# Patient Record
Sex: MALE | Race: WHITE | Employment: OTHER | URBAN - METROPOLITAN AREA
[De-identification: names, ages, dates, MRNs, and addresses within clinical notes are randomized per-mention and may not be internally consistent; named-entity substitution may affect disease eponyms.]

---

## 2017-07-06 PROBLEM — E11.9 DIABETES MELLITUS (HCC): Status: ACTIVE | Noted: 2017-07-06

## 2017-07-06 PROBLEM — R06.89 DYSPNEA AND RESPIRATORY ABNORMALITIES: Status: ACTIVE | Noted: 2017-07-06

## 2017-07-06 PROBLEM — M19.022 ARTHRITIS OF ELBOW, LEFT: Status: ACTIVE | Noted: 2017-07-06

## 2017-07-06 PROBLEM — E87.20 LACTIC ACIDOSIS: Status: ACTIVE | Noted: 2017-07-06

## 2017-07-06 PROBLEM — R06.00 DYSPNEA AND RESPIRATORY ABNORMALITIES: Status: ACTIVE | Noted: 2017-07-06

## 2017-07-07 PROBLEM — R06.02 SOB (SHORTNESS OF BREATH): Status: ACTIVE | Noted: 2017-07-06

## 2019-03-14 ENCOUNTER — APPOINTMENT (OUTPATIENT)
Dept: GENERAL RADIOLOGY | Age: 84
DRG: 301 | End: 2019-03-14
Payer: MEDICARE

## 2019-03-14 ENCOUNTER — APPOINTMENT (OUTPATIENT)
Dept: ULTRASOUND IMAGING | Age: 84
DRG: 301 | End: 2019-03-14
Payer: MEDICARE

## 2019-03-14 ENCOUNTER — APPOINTMENT (OUTPATIENT)
Dept: CT IMAGING | Age: 84
DRG: 301 | End: 2019-03-14
Payer: MEDICARE

## 2019-03-14 LAB
ALBUMIN SERPL-MCNC: 3.6 G/DL (ref 3.5–5.2)
ALP BLD-CCNC: 87 U/L (ref 40–129)
ALT SERPL-CCNC: 15 U/L (ref 0–40)
ANION GAP SERPL CALCULATED.3IONS-SCNC: 12 MMOL/L (ref 7–16)
APTT: 25.4 SEC (ref 24.5–35.1)
AST SERPL-CCNC: 18 U/L (ref 0–39)
BASOPHILS ABSOLUTE: 0.05 E9/L (ref 0–0.2)
BASOPHILS RELATIVE PERCENT: 0.5 % (ref 0–2)
BILIRUB SERPL-MCNC: 0.6 MG/DL (ref 0–1.2)
BUN BLDV-MCNC: 21 MG/DL (ref 8–23)
CALCIUM SERPL-MCNC: 9.4 MG/DL (ref 8.6–10.2)
CHLORIDE BLD-SCNC: 97 MMOL/L (ref 98–107)
CO2: 24 MMOL/L (ref 22–29)
CREAT SERPL-MCNC: 1.2 MG/DL (ref 0.7–1.2)
EOSINOPHILS ABSOLUTE: 0.12 E9/L (ref 0.05–0.5)
EOSINOPHILS RELATIVE PERCENT: 1.2 % (ref 0–6)
GFR AFRICAN AMERICAN: >60
GFR NON-AFRICAN AMERICAN: 57 ML/MIN/1.73
GLUCOSE BLD-MCNC: 319 MG/DL (ref 74–99)
HCT VFR BLD CALC: 39.9 % (ref 37–54)
HEMOGLOBIN: 13.2 G/DL (ref 12.5–16.5)
IMMATURE GRANULOCYTES #: 0.03 E9/L
IMMATURE GRANULOCYTES %: 0.3 % (ref 0–5)
INR BLD: 1.4
LACTIC ACID: 1.7 MMOL/L (ref 0.5–2.2)
LYMPHOCYTES ABSOLUTE: 1.29 E9/L (ref 1.5–4)
LYMPHOCYTES RELATIVE PERCENT: 12.9 % (ref 20–42)
MCH RBC QN AUTO: 29.9 PG (ref 26–35)
MCHC RBC AUTO-ENTMCNC: 33.1 % (ref 32–34.5)
MCV RBC AUTO: 90.5 FL (ref 80–99.9)
MONOCYTES ABSOLUTE: 0.92 E9/L (ref 0.1–0.95)
MONOCYTES RELATIVE PERCENT: 9.2 % (ref 2–12)
NEUTROPHILS ABSOLUTE: 7.6 E9/L (ref 1.8–7.3)
NEUTROPHILS RELATIVE PERCENT: 75.9 % (ref 43–80)
PDW BLD-RTO: 13.8 FL (ref 11.5–15)
PLATELET # BLD: 179 E9/L (ref 130–450)
PMV BLD AUTO: 10 FL (ref 7–12)
POTASSIUM SERPL-SCNC: 4.6 MMOL/L (ref 3.5–5)
PROTHROMBIN TIME: 16 SEC (ref 9.3–12.4)
RBC # BLD: 4.41 E12/L (ref 3.8–5.8)
SODIUM BLD-SCNC: 133 MMOL/L (ref 132–146)
TOTAL PROTEIN: 6.8 G/DL (ref 6.4–8.3)
TROPONIN: <0.01 NG/ML (ref 0–0.03)
WBC # BLD: 10 E9/L (ref 4.5–11.5)

## 2019-03-14 PROCEDURE — 70450 CT HEAD/BRAIN W/O DYE: CPT

## 2019-03-14 PROCEDURE — 72125 CT NECK SPINE W/O DYE: CPT

## 2019-03-14 PROCEDURE — 73562 X-RAY EXAM OF KNEE 3: CPT

## 2019-03-14 PROCEDURE — 85610 PROTHROMBIN TIME: CPT

## 2019-03-14 PROCEDURE — 93971 EXTREMITY STUDY: CPT

## 2019-03-14 PROCEDURE — 73590 X-RAY EXAM OF LOWER LEG: CPT

## 2019-03-14 PROCEDURE — 83605 ASSAY OF LACTIC ACID: CPT

## 2019-03-14 PROCEDURE — 80053 COMPREHEN METABOLIC PANEL: CPT

## 2019-03-14 PROCEDURE — 85025 COMPLETE CBC W/AUTO DIFF WBC: CPT

## 2019-03-14 PROCEDURE — 36415 COLL VENOUS BLD VENIPUNCTURE: CPT

## 2019-03-14 PROCEDURE — 87040 BLOOD CULTURE FOR BACTERIA: CPT

## 2019-03-14 PROCEDURE — 84484 ASSAY OF TROPONIN QUANT: CPT

## 2019-03-14 PROCEDURE — 85730 THROMBOPLASTIN TIME PARTIAL: CPT

## 2019-03-14 PROCEDURE — 73502 X-RAY EXAM HIP UNI 2-3 VIEWS: CPT

## 2019-03-14 ASSESSMENT — PAIN DESCRIPTION - PAIN TYPE: TYPE: ACUTE PAIN

## 2019-03-14 ASSESSMENT — PAIN SCALES - GENERAL: PAINLEVEL_OUTOF10: 6

## 2019-03-14 ASSESSMENT — PAIN DESCRIPTION - DESCRIPTORS: DESCRIPTORS: DISCOMFORT

## 2019-03-14 ASSESSMENT — PAIN DESCRIPTION - LOCATION: LOCATION: LEG;SHOULDER

## 2019-03-14 ASSESSMENT — PAIN DESCRIPTION - ORIENTATION: ORIENTATION: RIGHT

## 2019-03-15 ENCOUNTER — HOSPITAL ENCOUNTER (INPATIENT)
Age: 84
LOS: 3 days | Discharge: NURSING, REHAB OR LONG-TERM CARE | DRG: 301 | End: 2019-03-19
Attending: EMERGENCY MEDICINE | Admitting: INTERNAL MEDICINE
Payer: MEDICARE

## 2019-03-15 DIAGNOSIS — I82.401 ACUTE DEEP VEIN THROMBOSIS (DVT) OF RIGHT LOWER EXTREMITY, UNSPECIFIED VEIN (HCC): Primary | ICD-10-CM

## 2019-03-15 DIAGNOSIS — S49.91XA INJURY OF RIGHT SHOULDER, INITIAL ENCOUNTER: ICD-10-CM

## 2019-03-15 DIAGNOSIS — R26.2 UNABLE TO AMBULATE: ICD-10-CM

## 2019-03-15 PROBLEM — M19.022 ARTHRITIS OF ELBOW, LEFT: Status: RESOLVED | Noted: 2017-07-06 | Resolved: 2019-03-15
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
## 2019-03-15 PROBLEM — Z86.73 HISTORY OF COMPLETED STROKE: Chronic | Status: ACTIVE | Noted: 2019-03-15

## 2019-03-15 PROBLEM — Z86.73 HISTORY OF CVA (CEREBROVASCULAR ACCIDENT): Chronic | Status: ACTIVE | Noted: 2019-03-15

## 2019-03-15 PROBLEM — Z86.73 HISTORY OF COMPLETED STROKE: Chronic | Status: RESOLVED | Noted: 2019-03-15 | Resolved: 2019-03-15

## 2019-03-15 PROBLEM — I82.4Z1 LOWER LEG DVT (DEEP VENOUS THROMBOEMBOLISM), ACUTE, RIGHT (HCC): Status: ACTIVE | Noted: 2019-03-15

## 2019-03-15 LAB
BACTERIA: ABNORMAL /HPF
BILIRUBIN URINE: NEGATIVE
BLOOD, URINE: ABNORMAL
CHP ED QC CHECK: YES
CLARITY: CLEAR
COLOR: YELLOW
GLUCOSE BLD-MCNC: 225 MG/DL
GLUCOSE URINE: 500 MG/DL
KETONES, URINE: NEGATIVE MG/DL
LEUKOCYTE ESTERASE, URINE: ABNORMAL
METER GLUCOSE: 146 MG/DL (ref 74–99)
METER GLUCOSE: 175 MG/DL (ref 74–99)
METER GLUCOSE: 177 MG/DL (ref 74–99)
METER GLUCOSE: 214 MG/DL (ref 74–99)
METER GLUCOSE: 225 MG/DL (ref 74–99)
NITRITE, URINE: NEGATIVE
PH UA: 6.5 (ref 5–9)
PROTEIN UA: NEGATIVE MG/DL
RBC UA: ABNORMAL /HPF (ref 0–2)
SPECIFIC GRAVITY UA: <=1.005 (ref 1–1.03)
UROBILINOGEN, URINE: 1 E.U./DL
WBC UA: ABNORMAL /HPF (ref 0–5)

## 2019-03-15 PROCEDURE — 81001 URINALYSIS AUTO W/SCOPE: CPT

## 2019-03-15 PROCEDURE — G0378 HOSPITAL OBSERVATION PER HR: HCPCS

## 2019-03-15 PROCEDURE — 97165 OT EVAL LOW COMPLEX 30 MIN: CPT

## 2019-03-15 PROCEDURE — 6370000000 HC RX 637 (ALT 250 FOR IP): Performed by: INTERNAL MEDICINE

## 2019-03-15 PROCEDURE — 99285 EMERGENCY DEPT VISIT HI MDM: CPT

## 2019-03-15 PROCEDURE — 2580000003 HC RX 258: Performed by: INTERNAL MEDICINE

## 2019-03-15 PROCEDURE — 97530 THERAPEUTIC ACTIVITIES: CPT

## 2019-03-15 PROCEDURE — 82962 GLUCOSE BLOOD TEST: CPT

## 2019-03-15 PROCEDURE — 97535 SELF CARE MNGMENT TRAINING: CPT

## 2019-03-15 PROCEDURE — 97530 THERAPEUTIC ACTIVITIES: CPT | Performed by: PHYSICAL THERAPIST

## 2019-03-15 PROCEDURE — 6360000002 HC RX W HCPCS: Performed by: PHYSICIAN ASSISTANT

## 2019-03-15 PROCEDURE — 96372 THER/PROPH/DIAG INJ SC/IM: CPT

## 2019-03-15 PROCEDURE — 97161 PT EVAL LOW COMPLEX 20 MIN: CPT | Performed by: PHYSICAL THERAPIST

## 2019-03-15 RX ORDER — ASPIRIN 81 MG/1
81 TABLET, CHEWABLE ORAL EVERY MORNING
Status: DISCONTINUED | OUTPATIENT
Start: 2019-03-15 | End: 2019-03-19 | Stop reason: HOSPADM

## 2019-03-15 RX ORDER — METOPROLOL SUCCINATE 50 MG/1
50 TABLET, EXTENDED RELEASE ORAL EVERY MORNING
Status: DISCONTINUED | OUTPATIENT
Start: 2019-03-15 | End: 2019-03-19 | Stop reason: HOSPADM

## 2019-03-15 RX ORDER — GLIMEPIRIDE 4 MG/1
8 TABLET ORAL
Status: DISCONTINUED | OUTPATIENT
Start: 2019-03-15 | End: 2019-03-19 | Stop reason: HOSPADM

## 2019-03-15 RX ORDER — INSULIN GLARGINE 100 [IU]/ML
12 INJECTION, SOLUTION SUBCUTANEOUS EVERY EVENING
Status: DISCONTINUED | OUTPATIENT
Start: 2019-03-15 | End: 2019-03-19 | Stop reason: HOSPADM

## 2019-03-15 RX ORDER — OXYBUTYNIN CHLORIDE 5 MG/1
5 TABLET ORAL EVERY MORNING
Status: DISCONTINUED | OUTPATIENT
Start: 2019-03-15 | End: 2019-03-19 | Stop reason: HOSPADM

## 2019-03-15 RX ORDER — ACETAMINOPHEN 325 MG/1
650 TABLET ORAL EVERY 4 HOURS PRN
Status: DISCONTINUED | OUTPATIENT
Start: 2019-03-15 | End: 2019-03-19 | Stop reason: HOSPADM

## 2019-03-15 RX ORDER — DEXTROSE MONOHYDRATE 50 MG/ML
100 INJECTION, SOLUTION INTRAVENOUS PRN
Status: DISCONTINUED | OUTPATIENT
Start: 2019-03-15 | End: 2019-03-19 | Stop reason: HOSPADM

## 2019-03-15 RX ORDER — CLOPIDOGREL BISULFATE 75 MG/1
75 TABLET ORAL DAILY
Status: DISCONTINUED | OUTPATIENT
Start: 2019-03-15 | End: 2019-03-15

## 2019-03-15 RX ORDER — ATORVASTATIN CALCIUM 10 MG/1
10 TABLET, FILM COATED ORAL NIGHTLY
Status: DISCONTINUED | OUTPATIENT
Start: 2019-03-15 | End: 2019-03-19 | Stop reason: HOSPADM

## 2019-03-15 RX ORDER — DEXTROSE MONOHYDRATE 25 G/50ML
12.5 INJECTION, SOLUTION INTRAVENOUS PRN
Status: DISCONTINUED | OUTPATIENT
Start: 2019-03-15 | End: 2019-03-19 | Stop reason: HOSPADM

## 2019-03-15 RX ORDER — SODIUM CHLORIDE 0.9 % (FLUSH) 0.9 %
10 SYRINGE (ML) INJECTION PRN
Status: DISCONTINUED | OUTPATIENT
Start: 2019-03-15 | End: 2019-03-19 | Stop reason: HOSPADM

## 2019-03-15 RX ORDER — NICOTINE POLACRILEX 4 MG
15 LOZENGE BUCCAL PRN
Status: DISCONTINUED | OUTPATIENT
Start: 2019-03-15 | End: 2019-03-19 | Stop reason: HOSPADM

## 2019-03-15 RX ORDER — SODIUM CHLORIDE 0.9 % (FLUSH) 0.9 %
10 SYRINGE (ML) INJECTION EVERY 12 HOURS SCHEDULED
Status: DISCONTINUED | OUTPATIENT
Start: 2019-03-15 | End: 2019-03-19 | Stop reason: HOSPADM

## 2019-03-15 RX ADMIN — INSULIN GLARGINE 12 UNITS: 100 INJECTION, SOLUTION SUBCUTANEOUS at 18:00

## 2019-03-15 RX ADMIN — ATORVASTATIN CALCIUM 10 MG: 10 TABLET, FILM COATED ORAL at 20:45

## 2019-03-15 RX ADMIN — INSULIN LISPRO 4 UNITS: 100 INJECTION, SOLUTION INTRAVENOUS; SUBCUTANEOUS at 09:07

## 2019-03-15 RX ADMIN — ENOXAPARIN SODIUM 90 MG: 100 INJECTION SUBCUTANEOUS at 02:29

## 2019-03-15 RX ADMIN — APIXABAN 10 MG: 5 TABLET, FILM COATED ORAL at 20:45

## 2019-03-15 RX ADMIN — ACETAMINOPHEN 650 MG: 325 TABLET, FILM COATED ORAL at 10:05

## 2019-03-15 RX ADMIN — ASPIRIN 81 MG 81 MG: 81 TABLET ORAL at 09:58

## 2019-03-15 RX ADMIN — INSULIN LISPRO 2 UNITS: 100 INJECTION, SOLUTION INTRAVENOUS; SUBCUTANEOUS at 13:15

## 2019-03-15 RX ADMIN — Medication 10 ML: at 20:45

## 2019-03-15 RX ADMIN — APIXABAN 10 MG: 5 TABLET, FILM COATED ORAL at 16:56

## 2019-03-15 RX ADMIN — METOPROLOL SUCCINATE 50 MG: 50 TABLET, EXTENDED RELEASE ORAL at 09:59

## 2019-03-15 RX ADMIN — GLIMEPIRIDE 8 MG: 4 TABLET ORAL at 09:58

## 2019-03-15 RX ADMIN — Medication 10 ML: at 10:00

## 2019-03-15 RX ADMIN — LINAGLIPTIN 5 MG: 5 TABLET, FILM COATED ORAL at 09:59

## 2019-03-15 RX ADMIN — INSULIN LISPRO 1 UNITS: 100 INJECTION, SOLUTION INTRAVENOUS; SUBCUTANEOUS at 20:45

## 2019-03-15 RX ADMIN — OXYBUTYNIN CHLORIDE 5 MG: 5 TABLET ORAL at 10:00

## 2019-03-15 RX ADMIN — INSULIN LISPRO 2 UNITS: 100 INJECTION, SOLUTION INTRAVENOUS; SUBCUTANEOUS at 16:53

## 2019-03-15 ASSESSMENT — PAIN SCALES - GENERAL
PAINLEVEL_OUTOF10: 0
PAINLEVEL_OUTOF10: 4
PAINLEVEL_OUTOF10: 0

## 2019-03-15 ASSESSMENT — PAIN DESCRIPTION - DESCRIPTORS: DESCRIPTORS: ACHING;DULL;DISCOMFORT

## 2019-03-15 ASSESSMENT — PAIN DESCRIPTION - PROGRESSION: CLINICAL_PROGRESSION: NOT CHANGED

## 2019-03-15 ASSESSMENT — PAIN DESCRIPTION - PAIN TYPE: TYPE: CHRONIC PAIN

## 2019-03-15 ASSESSMENT — PAIN DESCRIPTION - LOCATION: LOCATION: SHOULDER

## 2019-03-15 ASSESSMENT — PAIN - FUNCTIONAL ASSESSMENT: PAIN_FUNCTIONAL_ASSESSMENT: ACTIVITIES ARE NOT PREVENTED

## 2019-03-16 PROBLEM — R13.10 DYSPHAGIA: Status: ACTIVE | Noted: 2019-03-16

## 2019-03-16 LAB
ANION GAP SERPL CALCULATED.3IONS-SCNC: 13 MMOL/L (ref 7–16)
BUN BLDV-MCNC: 24 MG/DL (ref 8–23)
CALCIUM SERPL-MCNC: 9 MG/DL (ref 8.6–10.2)
CHLORIDE BLD-SCNC: 104 MMOL/L (ref 98–107)
CO2: 21 MMOL/L (ref 22–29)
CREAT SERPL-MCNC: 1 MG/DL (ref 0.7–1.2)
GFR AFRICAN AMERICAN: >60
GFR NON-AFRICAN AMERICAN: >60 ML/MIN/1.73
GLUCOSE BLD-MCNC: 143 MG/DL (ref 74–99)
HCT VFR BLD CALC: 38.1 % (ref 37–54)
HEMOGLOBIN: 12.6 G/DL (ref 12.5–16.5)
MCH RBC QN AUTO: 29.5 PG (ref 26–35)
MCHC RBC AUTO-ENTMCNC: 33.1 % (ref 32–34.5)
MCV RBC AUTO: 89.2 FL (ref 80–99.9)
METER GLUCOSE: 137 MG/DL (ref 74–99)
METER GLUCOSE: 190 MG/DL (ref 74–99)
METER GLUCOSE: 201 MG/DL (ref 74–99)
METER GLUCOSE: 269 MG/DL (ref 74–99)
PDW BLD-RTO: 14 FL (ref 11.5–15)
PLATELET # BLD: 179 E9/L (ref 130–450)
PMV BLD AUTO: 10.4 FL (ref 7–12)
POTASSIUM SERPL-SCNC: 4.4 MMOL/L (ref 3.5–5)
RBC # BLD: 4.27 E12/L (ref 3.8–5.8)
SODIUM BLD-SCNC: 138 MMOL/L (ref 132–146)
WBC # BLD: 10.4 E9/L (ref 4.5–11.5)

## 2019-03-16 PROCEDURE — 2580000003 HC RX 258: Performed by: INTERNAL MEDICINE

## 2019-03-16 PROCEDURE — 6370000000 HC RX 637 (ALT 250 FOR IP): Performed by: INTERNAL MEDICINE

## 2019-03-16 PROCEDURE — 36415 COLL VENOUS BLD VENIPUNCTURE: CPT

## 2019-03-16 PROCEDURE — 2060000000 HC ICU INTERMEDIATE R&B

## 2019-03-16 PROCEDURE — 82962 GLUCOSE BLOOD TEST: CPT

## 2019-03-16 PROCEDURE — 80048 BASIC METABOLIC PNL TOTAL CA: CPT

## 2019-03-16 PROCEDURE — 85027 COMPLETE CBC AUTOMATED: CPT

## 2019-03-16 RX ORDER — ESCITALOPRAM OXALATE 10 MG/1
10 TABLET ORAL DAILY
COMMUNITY

## 2019-03-16 RX ADMIN — OXYBUTYNIN CHLORIDE 5 MG: 5 TABLET ORAL at 08:47

## 2019-03-16 RX ADMIN — Medication 10 ML: at 08:48

## 2019-03-16 RX ADMIN — ATORVASTATIN CALCIUM 10 MG: 10 TABLET, FILM COATED ORAL at 21:28

## 2019-03-16 RX ADMIN — METOPROLOL SUCCINATE 50 MG: 50 TABLET, EXTENDED RELEASE ORAL at 08:47

## 2019-03-16 RX ADMIN — APIXABAN 10 MG: 5 TABLET, FILM COATED ORAL at 08:46

## 2019-03-16 RX ADMIN — INSULIN LISPRO 4 UNITS: 100 INJECTION, SOLUTION INTRAVENOUS; SUBCUTANEOUS at 19:10

## 2019-03-16 RX ADMIN — ASPIRIN 81 MG 81 MG: 81 TABLET ORAL at 08:47

## 2019-03-16 RX ADMIN — Medication 10 ML: at 21:28

## 2019-03-16 RX ADMIN — INSULIN GLARGINE 12 UNITS: 100 INJECTION, SOLUTION SUBCUTANEOUS at 19:11

## 2019-03-16 RX ADMIN — INSULIN LISPRO 2 UNITS: 100 INJECTION, SOLUTION INTRAVENOUS; SUBCUTANEOUS at 11:18

## 2019-03-16 RX ADMIN — APIXABAN 10 MG: 5 TABLET, FILM COATED ORAL at 21:28

## 2019-03-16 RX ADMIN — LINAGLIPTIN 5 MG: 5 TABLET, FILM COATED ORAL at 08:45

## 2019-03-16 RX ADMIN — GLIMEPIRIDE 8 MG: 4 TABLET ORAL at 08:45

## 2019-03-16 RX ADMIN — INSULIN LISPRO 3 UNITS: 100 INJECTION, SOLUTION INTRAVENOUS; SUBCUTANEOUS at 21:29

## 2019-03-17 LAB
METER GLUCOSE: 120 MG/DL (ref 74–99)
METER GLUCOSE: 242 MG/DL (ref 74–99)
METER GLUCOSE: 247 MG/DL (ref 74–99)
METER GLUCOSE: 269 MG/DL (ref 74–99)

## 2019-03-17 PROCEDURE — 6370000000 HC RX 637 (ALT 250 FOR IP): Performed by: INTERNAL MEDICINE

## 2019-03-17 PROCEDURE — 82962 GLUCOSE BLOOD TEST: CPT

## 2019-03-17 PROCEDURE — 2580000003 HC RX 258: Performed by: INTERNAL MEDICINE

## 2019-03-17 PROCEDURE — 2060000000 HC ICU INTERMEDIATE R&B

## 2019-03-17 RX ADMIN — INSULIN LISPRO 2 UNITS: 100 INJECTION, SOLUTION INTRAVENOUS; SUBCUTANEOUS at 20:40

## 2019-03-17 RX ADMIN — INSULIN GLARGINE 12 UNITS: 100 INJECTION, SOLUTION SUBCUTANEOUS at 17:01

## 2019-03-17 RX ADMIN — APIXABAN 10 MG: 5 TABLET, FILM COATED ORAL at 20:40

## 2019-03-17 RX ADMIN — Medication 10 ML: at 10:03

## 2019-03-17 RX ADMIN — Medication 10 ML: at 21:06

## 2019-03-17 RX ADMIN — OXYBUTYNIN CHLORIDE 5 MG: 5 TABLET ORAL at 09:58

## 2019-03-17 RX ADMIN — INSULIN LISPRO 6 UNITS: 100 INJECTION, SOLUTION INTRAVENOUS; SUBCUTANEOUS at 12:31

## 2019-03-17 RX ADMIN — ASPIRIN 81 MG 81 MG: 81 TABLET ORAL at 10:03

## 2019-03-17 RX ADMIN — LINAGLIPTIN 5 MG: 5 TABLET, FILM COATED ORAL at 09:58

## 2019-03-17 RX ADMIN — GLIMEPIRIDE 8 MG: 4 TABLET ORAL at 09:58

## 2019-03-17 RX ADMIN — METOPROLOL SUCCINATE 50 MG: 50 TABLET, EXTENDED RELEASE ORAL at 09:59

## 2019-03-17 RX ADMIN — APIXABAN 10 MG: 5 TABLET, FILM COATED ORAL at 09:58

## 2019-03-17 RX ADMIN — ATORVASTATIN CALCIUM 10 MG: 10 TABLET, FILM COATED ORAL at 20:39

## 2019-03-17 RX ADMIN — INSULIN LISPRO 4 UNITS: 100 INJECTION, SOLUTION INTRAVENOUS; SUBCUTANEOUS at 17:02

## 2019-03-17 ASSESSMENT — PAIN SCALES - GENERAL
PAINLEVEL_OUTOF10: 0

## 2019-03-18 ENCOUNTER — APPOINTMENT (OUTPATIENT)
Dept: GENERAL RADIOLOGY | Age: 84
DRG: 301 | End: 2019-03-18
Payer: MEDICARE

## 2019-03-18 LAB
METER GLUCOSE: 161 MG/DL (ref 74–99)
METER GLUCOSE: 205 MG/DL (ref 74–99)
METER GLUCOSE: 223 MG/DL (ref 74–99)
METER GLUCOSE: 288 MG/DL (ref 74–99)

## 2019-03-18 PROCEDURE — 2500000003 HC RX 250 WO HCPCS: Performed by: PHYSICIAN ASSISTANT

## 2019-03-18 PROCEDURE — 74230 X-RAY XM SWLNG FUNCJ C+: CPT

## 2019-03-18 PROCEDURE — 2580000003 HC RX 258: Performed by: INTERNAL MEDICINE

## 2019-03-18 PROCEDURE — 6370000000 HC RX 637 (ALT 250 FOR IP): Performed by: INTERNAL MEDICINE

## 2019-03-18 PROCEDURE — 92611 MOTION FLUOROSCOPY/SWALLOW: CPT

## 2019-03-18 PROCEDURE — 97530 THERAPEUTIC ACTIVITIES: CPT

## 2019-03-18 PROCEDURE — 82962 GLUCOSE BLOOD TEST: CPT

## 2019-03-18 PROCEDURE — 2060000000 HC ICU INTERMEDIATE R&B

## 2019-03-18 RX ORDER — GLIMEPIRIDE 4 MG/1
8 TABLET ORAL
Qty: 60 TABLET | Refills: 0 | Status: ON HOLD | OUTPATIENT
Start: 2019-03-19 | End: 2021-01-01 | Stop reason: HOSPADM

## 2019-03-18 RX ADMIN — ASPIRIN 81 MG 81 MG: 81 TABLET ORAL at 09:33

## 2019-03-18 RX ADMIN — INSULIN LISPRO 2 UNITS: 100 INJECTION, SOLUTION INTRAVENOUS; SUBCUTANEOUS at 20:29

## 2019-03-18 RX ADMIN — GLIMEPIRIDE 8 MG: 4 TABLET ORAL at 09:33

## 2019-03-18 RX ADMIN — METOPROLOL SUCCINATE 50 MG: 50 TABLET, EXTENDED RELEASE ORAL at 09:33

## 2019-03-18 RX ADMIN — INSULIN LISPRO 6 UNITS: 100 INJECTION, SOLUTION INTRAVENOUS; SUBCUTANEOUS at 13:03

## 2019-03-18 RX ADMIN — ACETAMINOPHEN 650 MG: 325 TABLET, FILM COATED ORAL at 20:28

## 2019-03-18 RX ADMIN — APIXABAN 10 MG: 5 TABLET, FILM COATED ORAL at 09:33

## 2019-03-18 RX ADMIN — OXYBUTYNIN CHLORIDE 5 MG: 5 TABLET ORAL at 09:33

## 2019-03-18 RX ADMIN — INSULIN LISPRO 2 UNITS: 100 INJECTION, SOLUTION INTRAVENOUS; SUBCUTANEOUS at 09:34

## 2019-03-18 RX ADMIN — BARIUM SULFATE: 0.6 CREAM ORAL at 15:14

## 2019-03-18 RX ADMIN — ATORVASTATIN CALCIUM 10 MG: 10 TABLET, FILM COATED ORAL at 20:29

## 2019-03-18 RX ADMIN — INSULIN GLARGINE 12 UNITS: 100 INJECTION, SOLUTION SUBCUTANEOUS at 18:11

## 2019-03-18 RX ADMIN — Medication 10 ML: at 20:29

## 2019-03-18 RX ADMIN — APIXABAN 10 MG: 5 TABLET, FILM COATED ORAL at 20:29

## 2019-03-18 RX ADMIN — INSULIN LISPRO 4 UNITS: 100 INJECTION, SOLUTION INTRAVENOUS; SUBCUTANEOUS at 18:12

## 2019-03-18 RX ADMIN — Medication 10 ML: at 09:33

## 2019-03-18 RX ADMIN — LINAGLIPTIN 5 MG: 5 TABLET, FILM COATED ORAL at 09:33

## 2019-03-18 RX ADMIN — BARIUM SULFATE 45 G: 960 POWDER, FOR SUSPENSION ORAL at 15:15

## 2019-03-18 ASSESSMENT — PAIN DESCRIPTION - PROGRESSION
CLINICAL_PROGRESSION: NOT CHANGED
CLINICAL_PROGRESSION: NOT CHANGED

## 2019-03-18 ASSESSMENT — PAIN DESCRIPTION - PAIN TYPE
TYPE: CHRONIC PAIN
TYPE: ACUTE PAIN

## 2019-03-18 ASSESSMENT — PAIN DESCRIPTION - DESCRIPTORS
DESCRIPTORS: ACHING;DISCOMFORT
DESCRIPTORS: ACHING

## 2019-03-18 ASSESSMENT — PAIN SCALES - GENERAL
PAINLEVEL_OUTOF10: 0
PAINLEVEL_OUTOF10: 0
PAINLEVEL_OUTOF10: 6
PAINLEVEL_OUTOF10: 0
PAINLEVEL_OUTOF10: 5

## 2019-03-18 ASSESSMENT — PAIN DESCRIPTION - ONSET: ONSET: ON-GOING

## 2019-03-18 ASSESSMENT — PAIN DESCRIPTION - LOCATION
LOCATION: SHOULDER
LOCATION: SHOULDER

## 2019-03-18 ASSESSMENT — PAIN DESCRIPTION - FREQUENCY
FREQUENCY: INTERMITTENT
FREQUENCY: INTERMITTENT

## 2019-03-18 ASSESSMENT — PAIN - FUNCTIONAL ASSESSMENT: PAIN_FUNCTIONAL_ASSESSMENT: PREVENTS OR INTERFERES SOME ACTIVE ACTIVITIES AND ADLS

## 2019-03-18 ASSESSMENT — PAIN DESCRIPTION - ORIENTATION
ORIENTATION: RIGHT
ORIENTATION: RIGHT

## 2019-03-19 VITALS
HEIGHT: 68 IN | RESPIRATION RATE: 16 BRPM | WEIGHT: 193 LBS | HEART RATE: 75 BPM | BODY MASS INDEX: 29.25 KG/M2 | SYSTOLIC BLOOD PRESSURE: 114 MMHG | DIASTOLIC BLOOD PRESSURE: 77 MMHG | OXYGEN SATURATION: 94 % | TEMPERATURE: 98.7 F

## 2019-03-19 LAB
BLOOD CULTURE, ROUTINE: NORMAL
EKG ATRIAL RATE: 70 BPM
EKG P AXIS: 9 DEGREES
EKG P-R INTERVAL: 190 MS
EKG Q-T INTERVAL: 428 MS
EKG QRS DURATION: 136 MS
EKG QTC CALCULATION (BAZETT): 462 MS
EKG R AXIS: -42 DEGREES
EKG T AXIS: -2 DEGREES
EKG VENTRICULAR RATE: 70 BPM
METER GLUCOSE: 135 MG/DL (ref 74–99)
METER GLUCOSE: 194 MG/DL (ref 74–99)

## 2019-03-19 PROCEDURE — 6370000000 HC RX 637 (ALT 250 FOR IP): Performed by: INTERNAL MEDICINE

## 2019-03-19 PROCEDURE — 82962 GLUCOSE BLOOD TEST: CPT

## 2019-03-19 RX ADMIN — ASPIRIN 81 MG 81 MG: 81 TABLET ORAL at 08:46

## 2019-03-19 RX ADMIN — METOPROLOL SUCCINATE 50 MG: 50 TABLET, EXTENDED RELEASE ORAL at 08:45

## 2019-03-19 RX ADMIN — LINAGLIPTIN 5 MG: 5 TABLET, FILM COATED ORAL at 08:46

## 2019-03-19 RX ADMIN — GLIMEPIRIDE 8 MG: 4 TABLET ORAL at 08:46

## 2019-03-19 RX ADMIN — INSULIN LISPRO 2 UNITS: 100 INJECTION, SOLUTION INTRAVENOUS; SUBCUTANEOUS at 11:46

## 2019-03-19 RX ADMIN — APIXABAN 10 MG: 5 TABLET, FILM COATED ORAL at 08:45

## 2019-03-19 RX ADMIN — OXYBUTYNIN CHLORIDE 5 MG: 5 TABLET ORAL at 08:46

## 2019-03-19 ASSESSMENT — PAIN SCALES - GENERAL: PAINLEVEL_OUTOF10: 0
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
## 2019-09-19 ENCOUNTER — HOSPITAL ENCOUNTER (OUTPATIENT)
Age: 84
Discharge: HOME | End: 2019-09-21
Payer: MEDICARE

## 2019-09-19 PROCEDURE — 82365 CALCULUS SPECTROSCOPY: CPT

## 2019-09-19 PROCEDURE — 88300 SURGICAL PATH GROSS: CPT

## 2019-09-24 LAB
CALCULI COMPOSITION: NORMAL
MASS: NORMAL MG
STONE DESCRIPTION: NORMAL
STONE NUMBER: NORMAL
STONE SIZE: NORMAL MM

## 2019-11-30 ENCOUNTER — APPOINTMENT (OUTPATIENT)
Dept: GENERAL RADIOLOGY | Age: 84
End: 2019-11-30
Payer: MEDICARE

## 2019-11-30 ENCOUNTER — HOSPITAL ENCOUNTER (EMERGENCY)
Age: 84
Discharge: HOME | End: 2019-11-30
Attending: EMERGENCY MEDICINE
Payer: MEDICARE

## 2019-11-30 ENCOUNTER — APPOINTMENT (OUTPATIENT)
Dept: CT IMAGING | Age: 84
End: 2019-11-30
Payer: MEDICARE

## 2019-11-30 VITALS
BODY MASS INDEX: 25.7 KG/M2 | HEART RATE: 92 BPM | RESPIRATION RATE: 24 BRPM | WEIGHT: 169 LBS | DIASTOLIC BLOOD PRESSURE: 74 MMHG | OXYGEN SATURATION: 95 % | TEMPERATURE: 96.9 F | SYSTOLIC BLOOD PRESSURE: 100 MMHG

## 2019-11-30 DIAGNOSIS — E16.2 HYPOGLYCEMIA: Primary | ICD-10-CM

## 2019-11-30 LAB
ALBUMIN SERPL-MCNC: 3.2 G/DL (ref 3.5–5.2)
ALP BLD-CCNC: 73 U/L (ref 40–129)
ALT SERPL-CCNC: 16 U/L (ref 0–40)
ANION GAP SERPL CALCULATED.3IONS-SCNC: 8 MMOL/L (ref 7–16)
AST SERPL-CCNC: 27 U/L (ref 0–39)
BACTERIA: NORMAL /HPF
BASOPHILS ABSOLUTE: 0.07 E9/L (ref 0–0.2)
BASOPHILS RELATIVE PERCENT: 0.6 % (ref 0–2)
BILIRUB SERPL-MCNC: 0.5 MG/DL (ref 0–1.2)
BILIRUBIN URINE: NEGATIVE
BLOOD, URINE: ABNORMAL
BUN BLDV-MCNC: 23 MG/DL (ref 8–23)
CALCIUM SERPL-MCNC: 9.1 MG/DL (ref 8.6–10.2)
CHLORIDE BLD-SCNC: 103 MMOL/L (ref 98–107)
CHP ED QC CHECK: NORMAL
CLARITY: CLEAR
CO2: 27 MMOL/L (ref 22–29)
COLOR: YELLOW
CREAT SERPL-MCNC: 1.1 MG/DL (ref 0.7–1.2)
EOSINOPHILS ABSOLUTE: 0.06 E9/L (ref 0.05–0.5)
EOSINOPHILS RELATIVE PERCENT: 0.6 % (ref 0–6)
GFR AFRICAN AMERICAN: >60
GFR NON-AFRICAN AMERICAN: >60 ML/MIN/1.73
GLUCOSE BLD-MCNC: 207 MG/DL
GLUCOSE BLD-MCNC: 244 MG/DL (ref 74–99)
GLUCOSE URINE: 500 MG/DL
HCT VFR BLD CALC: 39 % (ref 37–54)
HEMOGLOBIN: 12.3 G/DL (ref 12.5–16.5)
IMMATURE GRANULOCYTES #: 0.08 E9/L
IMMATURE GRANULOCYTES %: 0.7 % (ref 0–5)
KETONES, URINE: NEGATIVE MG/DL
LEUKOCYTE ESTERASE, URINE: NEGATIVE
LYMPHOCYTES ABSOLUTE: 0.76 E9/L (ref 1.5–4)
LYMPHOCYTES RELATIVE PERCENT: 7 % (ref 20–42)
MCH RBC QN AUTO: 29.1 PG (ref 26–35)
MCHC RBC AUTO-ENTMCNC: 31.5 % (ref 32–34.5)
MCV RBC AUTO: 92.2 FL (ref 80–99.9)
METER GLUCOSE: 207 MG/DL (ref 74–99)
METER GLUCOSE: 212 MG/DL (ref 74–99)
MONOCYTES ABSOLUTE: 0.93 E9/L (ref 0.1–0.95)
MONOCYTES RELATIVE PERCENT: 8.6 % (ref 2–12)
NEUTROPHILS ABSOLUTE: 8.94 E9/L (ref 1.8–7.3)
NEUTROPHILS RELATIVE PERCENT: 82.5 % (ref 43–80)
NITRITE, URINE: NEGATIVE
PDW BLD-RTO: 15 FL (ref 11.5–15)
PH UA: 6 (ref 5–9)
PLATELET # BLD: 172 E9/L (ref 130–450)
PMV BLD AUTO: 9.7 FL (ref 7–12)
POTASSIUM SERPL-SCNC: 3.9 MMOL/L (ref 3.5–5)
PRO-BNP: 784 PG/ML (ref 0–450)
PROTEIN UA: NEGATIVE MG/DL
RBC # BLD: 4.23 E12/L (ref 3.8–5.8)
RBC UA: >20 /HPF (ref 0–2)
SODIUM BLD-SCNC: 138 MMOL/L (ref 132–146)
SPECIFIC GRAVITY UA: <=1.005 (ref 1–1.03)
TOTAL PROTEIN: 6.2 G/DL (ref 6.4–8.3)
UROBILINOGEN, URINE: 0.2 E.U./DL
WBC # BLD: 10.8 E9/L (ref 4.5–11.5)
WBC UA: NORMAL /HPF (ref 0–5)

## 2019-11-30 PROCEDURE — 71045 X-RAY EXAM CHEST 1 VIEW: CPT

## 2019-11-30 PROCEDURE — 82962 GLUCOSE BLOOD TEST: CPT

## 2019-11-30 PROCEDURE — 87088 URINE BACTERIA CULTURE: CPT

## 2019-11-30 PROCEDURE — 83880 ASSAY OF NATRIURETIC PEPTIDE: CPT

## 2019-11-30 PROCEDURE — 70450 CT HEAD/BRAIN W/O DYE: CPT

## 2019-11-30 PROCEDURE — 36415 COLL VENOUS BLD VENIPUNCTURE: CPT

## 2019-11-30 PROCEDURE — 81001 URINALYSIS AUTO W/SCOPE: CPT

## 2019-11-30 PROCEDURE — 80053 COMPREHEN METABOLIC PANEL: CPT

## 2019-11-30 PROCEDURE — 99285 EMERGENCY DEPT VISIT HI MDM: CPT

## 2019-11-30 PROCEDURE — 94640 AIRWAY INHALATION TREATMENT: CPT

## 2019-11-30 PROCEDURE — 6370000000 HC RX 637 (ALT 250 FOR IP): Performed by: EMERGENCY MEDICINE

## 2019-11-30 PROCEDURE — 85025 COMPLETE CBC W/AUTO DIFF WBC: CPT

## 2019-11-30 RX ORDER — IPRATROPIUM BROMIDE AND ALBUTEROL SULFATE 2.5; .5 MG/3ML; MG/3ML
1 SOLUTION RESPIRATORY (INHALATION) ONCE
Status: COMPLETED | OUTPATIENT
Start: 2019-11-30 | End: 2019-11-30

## 2019-11-30 RX ADMIN — IPRATROPIUM BROMIDE AND ALBUTEROL SULFATE 1 AMPULE: .5; 3 SOLUTION RESPIRATORY (INHALATION) at 10:09

## 2019-12-02 LAB — URINE CULTURE, ROUTINE: NORMAL

## 2020-01-01 ENCOUNTER — CARE COORDINATION (OUTPATIENT)
Dept: CASE MANAGEMENT | Age: 85
End: 2020-01-01

## 2020-01-01 ENCOUNTER — CARE COORDINATION (OUTPATIENT)
Dept: CARE COORDINATION | Age: 85
End: 2020-01-01

## 2020-07-30 ENCOUNTER — APPOINTMENT (OUTPATIENT)
Dept: GENERAL RADIOLOGY | Age: 85
DRG: 436 | End: 2020-07-30
Payer: MEDICARE

## 2020-07-30 ENCOUNTER — APPOINTMENT (OUTPATIENT)
Dept: CT IMAGING | Age: 85
DRG: 436 | End: 2020-07-30
Payer: MEDICARE

## 2020-07-30 ENCOUNTER — HOSPITAL ENCOUNTER (INPATIENT)
Age: 85
LOS: 2 days | Discharge: HOME | DRG: 436 | End: 2020-08-01
Attending: EMERGENCY MEDICINE | Admitting: INTERNAL MEDICINE
Payer: MEDICARE

## 2020-07-30 PROBLEM — K86.89 PANCREATIC MASS: Status: ACTIVE | Noted: 2020-07-30

## 2020-07-30 LAB
ABO/RH: NORMAL
ALBUMIN SERPL-MCNC: 3.2 G/DL (ref 3.5–5.2)
ALP BLD-CCNC: 72 U/L (ref 40–129)
ALT SERPL-CCNC: 22 U/L (ref 0–40)
ANION GAP SERPL CALCULATED.3IONS-SCNC: 10 MMOL/L (ref 7–16)
ANTIBODY SCREEN: NORMAL
APTT: 28.8 SEC (ref 24.5–35.1)
AST SERPL-CCNC: 21 U/L (ref 0–39)
BASOPHILS ABSOLUTE: 0.08 E9/L (ref 0–0.2)
BASOPHILS RELATIVE PERCENT: 0.8 % (ref 0–2)
BILIRUB SERPL-MCNC: 0.5 MG/DL (ref 0–1.2)
BUN BLDV-MCNC: 28 MG/DL (ref 8–23)
CALCIUM SERPL-MCNC: 9.1 MG/DL (ref 8.6–10.2)
CHLORIDE BLD-SCNC: 101 MMOL/L (ref 98–107)
CO2: 23 MMOL/L (ref 22–29)
CREAT SERPL-MCNC: 1 MG/DL (ref 0.7–1.2)
EOSINOPHILS ABSOLUTE: 0.1 E9/L (ref 0.05–0.5)
EOSINOPHILS RELATIVE PERCENT: 1 % (ref 0–6)
GFR AFRICAN AMERICAN: >60
GFR NON-AFRICAN AMERICAN: >60 ML/MIN/1.73
GLUCOSE BLD-MCNC: 311 MG/DL (ref 74–99)
HBA1C MFR BLD: 8.6 % (ref 4–5.6)
HCT VFR BLD CALC: 39.2 % (ref 37–54)
HEMOGLOBIN: 12.9 G/DL (ref 12.5–16.5)
IMMATURE GRANULOCYTES #: 0.05 E9/L
IMMATURE GRANULOCYTES %: 0.5 % (ref 0–5)
INR BLD: 1.8
LYMPHOCYTES ABSOLUTE: 1.58 E9/L (ref 1.5–4)
LYMPHOCYTES RELATIVE PERCENT: 15.9 % (ref 20–42)
MCH RBC QN AUTO: 30.3 PG (ref 26–35)
MCHC RBC AUTO-ENTMCNC: 32.9 % (ref 32–34.5)
MCV RBC AUTO: 92 FL (ref 80–99.9)
METER GLUCOSE: 193 MG/DL (ref 74–99)
METER GLUCOSE: 243 MG/DL (ref 74–99)
MONOCYTES ABSOLUTE: 0.89 E9/L (ref 0.1–0.95)
MONOCYTES RELATIVE PERCENT: 9 % (ref 2–12)
NEUTROPHILS ABSOLUTE: 7.21 E9/L (ref 1.8–7.3)
NEUTROPHILS RELATIVE PERCENT: 72.8 % (ref 43–80)
PDW BLD-RTO: 13.8 FL (ref 11.5–15)
PLATELET # BLD: 162 E9/L (ref 130–450)
PMV BLD AUTO: 10.2 FL (ref 7–12)
POTASSIUM REFLEX MAGNESIUM: 4.7 MMOL/L (ref 3.5–5)
PRO-BNP: 955 PG/ML (ref 0–450)
PROTHROMBIN TIME: 20.3 SEC (ref 9.3–12.4)
RBC # BLD: 4.26 E12/L (ref 3.8–5.8)
SODIUM BLD-SCNC: 134 MMOL/L (ref 132–146)
TOTAL PROTEIN: 5.9 G/DL (ref 6.4–8.3)
TROPONIN: 0.02 NG/ML (ref 0–0.03)
WBC # BLD: 9.9 E9/L (ref 4.5–11.5)

## 2020-07-30 PROCEDURE — 6360000004 HC RX CONTRAST MEDICATION: Performed by: RADIOLOGY

## 2020-07-30 PROCEDURE — 93005 ELECTROCARDIOGRAM TRACING: CPT | Performed by: FAMILY MEDICINE

## 2020-07-30 PROCEDURE — 86900 BLOOD TYPING SEROLOGIC ABO: CPT

## 2020-07-30 PROCEDURE — 84484 ASSAY OF TROPONIN QUANT: CPT

## 2020-07-30 PROCEDURE — 2060000000 HC ICU INTERMEDIATE R&B

## 2020-07-30 PROCEDURE — 83880 ASSAY OF NATRIURETIC PEPTIDE: CPT

## 2020-07-30 PROCEDURE — 80053 COMPREHEN METABOLIC PANEL: CPT

## 2020-07-30 PROCEDURE — 85730 THROMBOPLASTIN TIME PARTIAL: CPT

## 2020-07-30 PROCEDURE — 86901 BLOOD TYPING SEROLOGIC RH(D): CPT

## 2020-07-30 PROCEDURE — 71275 CT ANGIOGRAPHY CHEST: CPT

## 2020-07-30 PROCEDURE — 82962 GLUCOSE BLOOD TEST: CPT

## 2020-07-30 PROCEDURE — 94761 N-INVAS EAR/PLS OXIMETRY MLT: CPT

## 2020-07-30 PROCEDURE — 85025 COMPLETE CBC W/AUTO DIFF WBC: CPT

## 2020-07-30 PROCEDURE — 85610 PROTHROMBIN TIME: CPT

## 2020-07-30 PROCEDURE — 6360000002 HC RX W HCPCS: Performed by: INTERNAL MEDICINE

## 2020-07-30 PROCEDURE — 2580000003 HC RX 258: Performed by: RADIOLOGY

## 2020-07-30 PROCEDURE — 2580000003 HC RX 258: Performed by: INTERNAL MEDICINE

## 2020-07-30 PROCEDURE — 2580000003 HC RX 258: Performed by: EMERGENCY MEDICINE

## 2020-07-30 PROCEDURE — 83036 HEMOGLOBIN GLYCOSYLATED A1C: CPT

## 2020-07-30 PROCEDURE — 6370000000 HC RX 637 (ALT 250 FOR IP): Performed by: INTERNAL MEDICINE

## 2020-07-30 PROCEDURE — 86850 RBC ANTIBODY SCREEN: CPT

## 2020-07-30 PROCEDURE — 71045 X-RAY EXAM CHEST 1 VIEW: CPT

## 2020-07-30 PROCEDURE — 99285 EMERGENCY DEPT VISIT HI MDM: CPT

## 2020-07-30 PROCEDURE — 36415 COLL VENOUS BLD VENIPUNCTURE: CPT

## 2020-07-30 RX ORDER — SODIUM CHLORIDE 0.9 % (FLUSH) 0.9 %
10 SYRINGE (ML) INJECTION ONCE
Status: COMPLETED | OUTPATIENT
Start: 2020-07-30 | End: 2020-07-30

## 2020-07-30 RX ORDER — INSULIN GLARGINE 100 [IU]/ML
10 INJECTION, SOLUTION SUBCUTANEOUS NIGHTLY
Status: DISCONTINUED | OUTPATIENT
Start: 2020-07-30 | End: 2020-08-01 | Stop reason: HOSPADM

## 2020-07-30 RX ORDER — OXYBUTYNIN CHLORIDE 5 MG/1
5 TABLET ORAL EVERY MORNING
Status: DISCONTINUED | OUTPATIENT
Start: 2020-07-31 | End: 2020-08-01 | Stop reason: HOSPADM

## 2020-07-30 RX ORDER — SODIUM CHLORIDE 9 MG/ML
INJECTION, SOLUTION INTRAVENOUS CONTINUOUS
Status: DISCONTINUED | OUTPATIENT
Start: 2020-07-30 | End: 2020-07-30

## 2020-07-30 RX ORDER — METOPROLOL SUCCINATE 50 MG/1
50 TABLET, EXTENDED RELEASE ORAL EVERY MORNING
Status: DISCONTINUED | OUTPATIENT
Start: 2020-07-31 | End: 2020-08-01 | Stop reason: HOSPADM

## 2020-07-30 RX ORDER — SODIUM CHLORIDE 0.9 % (FLUSH) 0.9 %
10 SYRINGE (ML) INJECTION PRN
Status: DISCONTINUED | OUTPATIENT
Start: 2020-07-30 | End: 2020-08-01 | Stop reason: HOSPADM

## 2020-07-30 RX ORDER — ESCITALOPRAM OXALATE 10 MG/1
10 TABLET ORAL DAILY
Status: DISCONTINUED | OUTPATIENT
Start: 2020-07-30 | End: 2020-08-01 | Stop reason: HOSPADM

## 2020-07-30 RX ORDER — NICOTINE POLACRILEX 4 MG
15 LOZENGE BUCCAL PRN
Status: DISCONTINUED | OUTPATIENT
Start: 2020-07-30 | End: 2020-08-01 | Stop reason: HOSPADM

## 2020-07-30 RX ORDER — PROMETHAZINE HYDROCHLORIDE 25 MG/1
12.5 TABLET ORAL EVERY 6 HOURS PRN
Status: DISCONTINUED | OUTPATIENT
Start: 2020-07-30 | End: 2020-08-01 | Stop reason: HOSPADM

## 2020-07-30 RX ORDER — DEXTROSE MONOHYDRATE 50 MG/ML
100 INJECTION, SOLUTION INTRAVENOUS PRN
Status: DISCONTINUED | OUTPATIENT
Start: 2020-07-30 | End: 2020-08-01 | Stop reason: HOSPADM

## 2020-07-30 RX ORDER — ACETAMINOPHEN 650 MG/1
650 SUPPOSITORY RECTAL EVERY 6 HOURS PRN
Status: DISCONTINUED | OUTPATIENT
Start: 2020-07-30 | End: 2020-08-01 | Stop reason: HOSPADM

## 2020-07-30 RX ORDER — ASPIRIN 81 MG/1
81 TABLET, CHEWABLE ORAL EVERY MORNING
Status: DISCONTINUED | OUTPATIENT
Start: 2020-07-31 | End: 2020-08-01 | Stop reason: HOSPADM

## 2020-07-30 RX ORDER — HEPARIN SODIUM 10000 [USP'U]/ML
5000 INJECTION, SOLUTION INTRAVENOUS; SUBCUTANEOUS EVERY 8 HOURS SCHEDULED
Status: DISCONTINUED | OUTPATIENT
Start: 2020-07-30 | End: 2020-08-01 | Stop reason: HOSPADM

## 2020-07-30 RX ORDER — POLYETHYLENE GLYCOL 3350 17 G/17G
17 POWDER, FOR SOLUTION ORAL DAILY PRN
Status: DISCONTINUED | OUTPATIENT
Start: 2020-07-30 | End: 2020-08-01 | Stop reason: HOSPADM

## 2020-07-30 RX ORDER — ATORVASTATIN CALCIUM 10 MG/1
10 TABLET, FILM COATED ORAL NIGHTLY
Status: DISCONTINUED | OUTPATIENT
Start: 2020-07-30 | End: 2020-08-01 | Stop reason: HOSPADM

## 2020-07-30 RX ORDER — SODIUM CHLORIDE 0.9 % (FLUSH) 0.9 %
10 SYRINGE (ML) INJECTION EVERY 12 HOURS SCHEDULED
Status: DISCONTINUED | OUTPATIENT
Start: 2020-07-30 | End: 2020-08-01 | Stop reason: HOSPADM

## 2020-07-30 RX ORDER — DEXTROSE MONOHYDRATE 25 G/50ML
12.5 INJECTION, SOLUTION INTRAVENOUS PRN
Status: DISCONTINUED | OUTPATIENT
Start: 2020-07-30 | End: 2020-08-01 | Stop reason: HOSPADM

## 2020-07-30 RX ORDER — ACETAMINOPHEN 325 MG/1
650 TABLET ORAL EVERY 6 HOURS PRN
Status: DISCONTINUED | OUTPATIENT
Start: 2020-07-30 | End: 2020-08-01 | Stop reason: HOSPADM

## 2020-07-30 RX ORDER — ONDANSETRON 2 MG/ML
4 INJECTION INTRAMUSCULAR; INTRAVENOUS EVERY 6 HOURS PRN
Status: DISCONTINUED | OUTPATIENT
Start: 2020-07-30 | End: 2020-08-01 | Stop reason: HOSPADM

## 2020-07-30 RX ADMIN — INSULIN LISPRO 1 UNITS: 100 INJECTION, SOLUTION INTRAVENOUS; SUBCUTANEOUS at 18:10

## 2020-07-30 RX ADMIN — SODIUM CHLORIDE, PRESERVATIVE FREE 10 ML: 5 INJECTION INTRAVENOUS at 20:29

## 2020-07-30 RX ADMIN — SODIUM CHLORIDE: 9 INJECTION, SOLUTION INTRAVENOUS at 15:20

## 2020-07-30 RX ADMIN — INSULIN LISPRO 1 UNITS: 100 INJECTION, SOLUTION INTRAVENOUS; SUBCUTANEOUS at 20:26

## 2020-07-30 RX ADMIN — INSULIN GLARGINE 10 UNITS: 100 INJECTION, SOLUTION SUBCUTANEOUS at 20:26

## 2020-07-30 RX ADMIN — Medication 10 ML: at 15:45

## 2020-07-30 RX ADMIN — IOPAMIDOL 75 ML: 755 INJECTION, SOLUTION INTRAVENOUS at 15:45

## 2020-07-30 RX ADMIN — HEPARIN SODIUM 5000 UNITS: 10000 INJECTION INTRAVENOUS; SUBCUTANEOUS at 21:36

## 2020-07-30 RX ADMIN — ATORVASTATIN CALCIUM 10 MG: 10 TABLET, FILM COATED ORAL at 20:27

## 2020-07-30 ASSESSMENT — ENCOUNTER SYMPTOMS
DIARRHEA: 0
SORE THROAT: 0
SHORTNESS OF BREATH: 1
CONSTIPATION: 0
RHINORRHEA: 0
ABDOMINAL PAIN: 0
BLOOD IN STOOL: 0
VOMITING: 0
COUGH: 0
NAUSEA: 0
WHEEZING: 0
CHEST TIGHTNESS: 0

## 2020-07-30 ASSESSMENT — PAIN SCALES - GENERAL
PAINLEVEL_OUTOF10: 0
PAINLEVEL_OUTOF10: 0

## 2020-07-30 NOTE — ED NOTES
Bed: 01  Expected date:   Expected time:   Means of arrival:   Comments:     Angelica Deng RN  07/30/20 8747

## 2020-07-30 NOTE — ED PROVIDER NOTES
[facility]  EMERGENCY DEPARTMENT ENCOUNTER        History of Present Illness:  7/30/20, Time: 2:24 PM EDT  Chief Complaint   Patient presents with    Shortness of Breath     with exertion         Kimberly Cantu is a 80 y.o. male with PMH of CVA x3, IDDM and HLD who presented to the ED because daughter was concerned that he was short of breath today. Exertion makes it worse, rest makes It better, no associated pain. Patient notes that this morning after walking from the bedroom to the kitchen he felt like he needed to catch his breath. Patient denied any chest pain, palpitations, lightheadedness, diaphoresis, LOC or weakness. During evaluation patient did note that he has had multiple dark stools in the last month that are intermittent. Review of Systems:   Review of Systems   Constitutional: Negative for appetite change, chills, diaphoresis, fatigue and fever. HENT: Negative for congestion, rhinorrhea and sore throat. Eyes: Negative for visual disturbance. Respiratory: Positive for shortness of breath. Negative for cough, chest tightness and wheezing. Cardiovascular: Negative for chest pain, palpitations and leg swelling. Gastrointestinal: Negative for abdominal pain, blood in stool, constipation, diarrhea, nausea and vomiting. Intermittent dark stools x1 month   Endocrine: Negative for polydipsia and polyuria. Genitourinary: Negative for difficulty urinating, frequency, hematuria and urgency. Musculoskeletal: Negative for arthralgias and myalgias. Skin: Negative for pallor. Neurological: Negative for dizziness, light-headedness, numbness and headaches.    Hematological: Does not bruise/bleed easily.       --------------------------------------------- PAST HISTORY ---------------------------------------------  Past Medical History:  has a past medical history of Compression fracture, Depression, [facility] Diabetes mellitus patient. Results are listed below.      LABS: (Lab results interpreted by me)  Results for orders placed or performed during the hospital encounter of 07/30/20   CBC Auto Differential   Result Value Ref Range    WBC 9.9 4.5 - 11.5 E9/L    RBC 4.26 3.80 - 5.80 E12/L    Hemoglobin 12.9 12.5 - 16.5 g/dL    Hematocrit 39.2 37.0 - 54.0 %    MCV 92.0 80.0 - 99.9 fL    MCH 30.3 26.0 - 35.0 pg    MCHC 32.9 32.0 - 34.5 %    RDW 13.8 11.5 - 15.0 fL    Platelets 657 626 - 166 E9/L    MPV 10.2 7.0 - 12.0 fL    Neutrophils % 72.8 43.0 - 80.0 %    Immature Granulocytes % 0.5 0.0 - 5.0 %    Lymphocytes % 15.9 (L) 20.0 - 42.0 %    Monocytes % 9.0 2.0 - 12.0 %    Eosinophils % 1.0 0.0 - 6.0 %    Basophils % 0.8 0.0 - 2.0 %    Neutrophils Absolute 7.21 1.80 - 7.30 E9/L    Immature Granulocytes # 0.05 E9/L    Lymphocytes Absolute 1.58 1.50 - 4.00 E9/L    Monocytes Absolute 0.89 0.10 - 0.95 E9/L    Eosinophils Absolute 0.10 0.05 - 0.50 E9/L    Basophils Absolute 0.08 0.00 - 0.20 E9/L   Comprehensive Metabolic Panel w/ Reflex to MG   Result Value Ref Range    Sodium 134 132 - 146 mmol/L    Potassium reflex Magnesium 4.7 3.5 - 5.0 mmol/L    Chloride 101 98 - 107 mmol/L    CO2 23 22 - 29 mmol/L    Anion Gap 10 7 - 16 mmol/L    Glucose 311 (H) 74 - 99 mg/dL    BUN 28 (H) 8 - 23 mg/dL    CREATININE 1.0 0.7 - 1.2 mg/dL    GFR Non-African American >60 >=60 mL/min/1.73    GFR African American >60     Calcium 9.1 8.6 - 10.2 mg/dL    Total Protein 5.9 (L) 6.4 - 8.3 g/dL    Alb 3.2 (L) 3.5 - 5.2 g/dL    Total Bilirubin 0.5 0.0 - 1.2 mg/dL    Alkaline Phosphatase 72 40 - 129 U/L    ALT 22 0 - 40 U/L    AST 21 0 - 39 U/L   Protime-INR   Result Value Ref Range    Protime 20.3 (H) 9.3 - 12.4 sec    INR 1.8    APTT   Result Value Ref Range    aPTT 28.8 24.5 - 35.1 sec   Troponin   Result Value Ref Range    Troponin 0.02 0.00 - 0.03 ng/mL   Brain Natriuretic Peptide   Result Value Ref Range    Pro- (H) 0 - 450 pg/mL   TYPE AND SCREEN   Result ordered secondary to the patient's chest pain and to monitor for patient for dysrhythmia. CPT 19405    Medical Decision Makin-year-old male who was sent to the ED for an episode of shortness of breath today, question mated to having dark stools for the past month. Upon arrival to the ED patient was hemodynamically stable with unremarkable vitals and physical exam.  Laboratory studies were remarkable for proBNP of 955 otherwise unremarkable. Chest x-ray showed no acute process. CTA of the chest showed no pulmonary embolism, possible pancreatic malignancy and scarring and atelectasis in the lower lobes bilaterally. This patient's ED course included: a personal history and physicial examination and re-evaluation prior to disposition    This patient has remained hemodynamically stable and improved during their ED course. Consultations:  IM    Counseling: The emergency provider has spoken with the patient and family member patient and daughter and discussed todays results, in addition to providing specific details for the plan of care and counseling regarding the diagnosis and prognosis. Questions are answered at this time and they are agreeable with the plan.       --------------------------------- IMPRESSION AND DISPOSITION ---------------------------------    IMPRESSION  1. Pancreatic mass        DISPOSITION  Disposition: Admit to telemetry  Patient condition is stable        NOTE: This report was transcribed using voice recognition software. Every effort was made to ensure accuracy; however, inadvertent computerized transcription errors may be present        Kavtia Sethi MD  Resident  [phone]    ATTENDING PROVIDER ATTESTATION:     Landen Crawford presented to the emergency department for evaluation of Shortness of Breath (with exertion )   and was initially evaluated by the Medical Resident. See Original ED Note for H&P and ED course above.      I have reviewed and discussed the case, including pertinent history (medical, surgical, family and social) and exam findings with the Medical Resident assigned to [employer]. I have personally performed and/or participated in the history, exam, medical decision making, and procedures and agree with all pertinent clinical information. I have reviewed my findings and recommendations with the assigned Medical Resident, [facility] and members of family present at the time of disposition. My findings/plan: The encounter diagnosis was Pancreatic mass.   Discharge Medication List as of 8/1/2020  1:46 PM      START taking these medications    Details   furosemide (LASIX) 20 MG tablet Take 1 tablet by mouth daily, Disp-60 tablet,R-3Normal           MD Amber Meng MD  08/02/20 8976

## 2020-07-31 LAB
ANION GAP SERPL CALCULATED.3IONS-SCNC: 12 MMOL/L (ref 7–16)
BASOPHILS ABSOLUTE: 0.07 E9/L (ref 0–0.2)
BASOPHILS RELATIVE PERCENT: 0.8 % (ref 0–2)
BUN BLDV-MCNC: 26 MG/DL (ref 8–23)
CALCIUM SERPL-MCNC: 9 MG/DL (ref 8.6–10.2)
CEA: 3.7 NG/ML (ref 0–5.2)
CHLORIDE BLD-SCNC: 103 MMOL/L (ref 98–107)
CO2: 24 MMOL/L (ref 22–29)
CREAT SERPL-MCNC: 1.1 MG/DL (ref 0.7–1.2)
EKG ATRIAL RATE: 83 BPM
EKG P AXIS: 41 DEGREES
EKG P-R INTERVAL: 198 MS
EKG Q-T INTERVAL: 410 MS
EKG QRS DURATION: 124 MS
EKG QTC CALCULATION (BAZETT): 481 MS
EKG R AXIS: -29 DEGREES
EKG T AXIS: -12 DEGREES
EKG VENTRICULAR RATE: 83 BPM
EOSINOPHILS ABSOLUTE: 0.21 E9/L (ref 0.05–0.5)
EOSINOPHILS RELATIVE PERCENT: 2.5 % (ref 0–6)
GFR AFRICAN AMERICAN: >60
GFR NON-AFRICAN AMERICAN: >60 ML/MIN/1.73
GLUCOSE BLD-MCNC: 142 MG/DL (ref 74–99)
HCT VFR BLD CALC: 40.2 % (ref 37–54)
HEMOGLOBIN: 13.1 G/DL (ref 12.5–16.5)
IMMATURE GRANULOCYTES #: 0.05 E9/L
IMMATURE GRANULOCYTES %: 0.6 % (ref 0–5)
LYMPHOCYTES ABSOLUTE: 1.74 E9/L (ref 1.5–4)
LYMPHOCYTES RELATIVE PERCENT: 20.4 % (ref 20–42)
MCH RBC QN AUTO: 30 PG (ref 26–35)
MCHC RBC AUTO-ENTMCNC: 32.6 % (ref 32–34.5)
MCV RBC AUTO: 92.2 FL (ref 80–99.9)
METER GLUCOSE: 132 MG/DL (ref 74–99)
METER GLUCOSE: 164 MG/DL (ref 74–99)
METER GLUCOSE: 182 MG/DL (ref 74–99)
METER GLUCOSE: 224 MG/DL (ref 74–99)
MONOCYTES ABSOLUTE: 0.9 E9/L (ref 0.1–0.95)
MONOCYTES RELATIVE PERCENT: 10.6 % (ref 2–12)
NEUTROPHILS ABSOLUTE: 5.55 E9/L (ref 1.8–7.3)
NEUTROPHILS RELATIVE PERCENT: 65.1 % (ref 43–80)
PDW BLD-RTO: 13.4 FL (ref 11.5–15)
PLATELET # BLD: 156 E9/L (ref 130–450)
PMV BLD AUTO: 10.5 FL (ref 7–12)
POTASSIUM REFLEX MAGNESIUM: 4.1 MMOL/L (ref 3.5–5)
RBC # BLD: 4.36 E12/L (ref 3.8–5.8)
SODIUM BLD-SCNC: 139 MMOL/L (ref 132–146)
WBC # BLD: 8.5 E9/L (ref 4.5–11.5)

## 2020-07-31 PROCEDURE — 80048 BASIC METABOLIC PNL TOTAL CA: CPT

## 2020-07-31 PROCEDURE — 86301 IMMUNOASSAY TUMOR CA 19-9: CPT

## 2020-07-31 PROCEDURE — 85025 COMPLETE CBC W/AUTO DIFF WBC: CPT

## 2020-07-31 PROCEDURE — 6370000000 HC RX 637 (ALT 250 FOR IP): Performed by: INTERNAL MEDICINE

## 2020-07-31 PROCEDURE — 82962 GLUCOSE BLOOD TEST: CPT

## 2020-07-31 PROCEDURE — 6360000002 HC RX W HCPCS: Performed by: INTERNAL MEDICINE

## 2020-07-31 PROCEDURE — 36415 COLL VENOUS BLD VENIPUNCTURE: CPT

## 2020-07-31 PROCEDURE — 2060000000 HC ICU INTERMEDIATE R&B

## 2020-07-31 PROCEDURE — 2580000003 HC RX 258: Performed by: INTERNAL MEDICINE

## 2020-07-31 PROCEDURE — 82378 CARCINOEMBRYONIC ANTIGEN: CPT

## 2020-07-31 PROCEDURE — 86316 IMMUNOASSAY TUMOR OTHER: CPT

## 2020-07-31 PROCEDURE — 99222 1ST HOSP IP/OBS MODERATE 55: CPT | Performed by: SURGERY

## 2020-07-31 RX ORDER — FUROSEMIDE 40 MG/1
40 TABLET ORAL DAILY
Status: DISCONTINUED | OUTPATIENT
Start: 2020-07-31 | End: 2020-08-01 | Stop reason: HOSPADM

## 2020-07-31 RX ADMIN — INSULIN GLARGINE 10 UNITS: 100 INJECTION, SOLUTION SUBCUTANEOUS at 20:20

## 2020-07-31 RX ADMIN — OXYBUTYNIN CHLORIDE 5 MG: 5 TABLET ORAL at 09:19

## 2020-07-31 RX ADMIN — SODIUM CHLORIDE, PRESERVATIVE FREE 10 ML: 5 INJECTION INTRAVENOUS at 20:20

## 2020-07-31 RX ADMIN — HEPARIN SODIUM 5000 UNITS: 10000 INJECTION INTRAVENOUS; SUBCUTANEOUS at 20:20

## 2020-07-31 RX ADMIN — INSULIN LISPRO 1 UNITS: 100 INJECTION, SOLUTION INTRAVENOUS; SUBCUTANEOUS at 13:33

## 2020-07-31 RX ADMIN — FUROSEMIDE 40 MG: 40 TABLET ORAL at 17:34

## 2020-07-31 RX ADMIN — SODIUM CHLORIDE, PRESERVATIVE FREE 10 ML: 5 INJECTION INTRAVENOUS at 09:20

## 2020-07-31 RX ADMIN — ESCITALOPRAM 10 MG: 10 TABLET, FILM COATED ORAL at 09:20

## 2020-07-31 RX ADMIN — ATORVASTATIN CALCIUM 10 MG: 10 TABLET, FILM COATED ORAL at 20:19

## 2020-07-31 RX ADMIN — METOPROLOL SUCCINATE 50 MG: 50 TABLET, EXTENDED RELEASE ORAL at 09:19

## 2020-07-31 RX ADMIN — ASPIRIN 81 MG CHEWABLE TABLET 81 MG: 81 TABLET CHEWABLE at 09:23

## 2020-07-31 RX ADMIN — INSULIN LISPRO 1 UNITS: 100 INJECTION, SOLUTION INTRAVENOUS; SUBCUTANEOUS at 20:20

## 2020-07-31 RX ADMIN — INSULIN LISPRO 1 UNITS: 100 INJECTION, SOLUTION INTRAVENOUS; SUBCUTANEOUS at 17:34

## 2020-07-31 RX ADMIN — HEPARIN SODIUM 5000 UNITS: 10000 INJECTION INTRAVENOUS; SUBCUTANEOUS at 14:43

## 2020-07-31 RX ADMIN — HEPARIN SODIUM 5000 UNITS: 10000 INJECTION INTRAVENOUS; SUBCUTANEOUS at 05:56

## 2020-07-31 ASSESSMENT — PAIN SCALES - GENERAL
PAINLEVEL_OUTOF10: 0

## 2020-07-31 NOTE — CONSULTS
1 Chela Vega MD    Patient's Name/Date of Birth: Osmin Goldstein / 8/30/1927    Date: July 31, 2020     Consulting Surgeon: Christine Ventura MD    PCP: Aditya Anne MD     Chief Complaint: Pancreatic mass    HPI:   Osmin Goldstein is a 80 y.o. male who presents for evaluation of SOB. He had CTA chest that revealed pancreatic mass. I was consulted for possible EUS. He denies weight loss. He has no family of pancreatic ca. Patient Active Problem List   Diagnosis    HTN (hypertension), benign    Mixed hyperlipidemia    Diabetes mellitus type 2, uncontrolled ([address]    Lower leg DVT (deep venous thromboembolism), acute, right ([address]    History of CVA (cerebrovascular accident)    Dysphagia    Pancreatic mass       Allergies   Allergen Reactions    Bupivacaine     Niaspan [Niacin Er]     Vicodin [Hydrocodone-Acetaminophen]      hallucinations       Past Medical History:   Diagnosis Date    Compression fracture     L1    Depression     Diabetes ([address]     Diabetes mellitus ([address]     H/O blood clots     Heart murmur     Hyperlipidemia 5/8/2012    Hypertension 5/8/2012    Pneumothorax     ECF staff unable to confirm    Possible atherosclerotic coronary artery disease.  5/8/2012    Dr. Suri Bernabe [facility]br[facility])     Unspecified cerebral artery occlusion with cerebral infarction     slight right sided weakness       Past Surgical History:   Procedure Laterality Date    JOINT REPLACEMENT      right hip 4 times    KNEE SURGERY      both knees    OTHER SURGICAL HISTORY  11/5/2015    kyphoplasty-L1    TONSILLECTOMY         Social History     Socioeconomic History    Marital status:      Spouse name: Not on file    Number of children: Not on file    Years of education: Not on file    Highest education level: Not on file   Occupational History    Not on file   Social Needs    Financial resource strain: Not on file   Neelam-Demarcus negative unless mentioned in the above HPI. Specific negatives are listed below but may not include all those reviewed. General ROS: negative obtundation, AMS  ENT ROS: negative rhinorrhea, epistaxis  Allergy and Immunology ROS: negative itchy/watery eyes or nasal congestion  Hematological and Lymphatic ROS: negative spontaneous bleeding or bruising  Endocrine ROS: negative  lethargy, mood swings, palpitations or polydipsia/polyuria  Respiratory ROS: negative sputum changes, stridor, tachypnea or wheezing  Cardiovascular ROS: negative for - loss of consciousness, murmur or orthopnea  Gastrointestinal ROS: negative for - hematochezia or hematemesis  Genito-Urinary ROS: negative for -  genital discharge or hematuria  Musculoskeletal ROS: negative for - focal weakness, gangrene  Psych/Neuro ROS: negative for - visual or auditory hallucinations, suicidal ideation      Physical exam:   /64   Pulse 63   Temp 98.4 °F (36.9 °C) (Oral)   Resp 16   Ht 5' 7\" (1.702 m)   Wt 175 lb (79.4 kg)   SpO2 95%   BMI 27.41 kg/m²   General appearance:  NAD, appears stated age  Head: NCAT, PERRLA, EOMI, red conjunctiva  Neck: supple, no masses, trachea midline  Lungs: Equal chest rise bilateral, no retractions, no wheezing  Heart: Reg rate  Abdomen: soft, NT, ND  Skin; warm and dry, no cyanosis  Gu: no cva tenderness  Extremities: atraumatic, no focal motor deficits, no open wounds  Psych: No tremor, visual hallucinations    Pathology: n/a    Radiology: I reviewed relevant abdominal imaging from this admission and that available in the EMR including CT abd/pel from 08/01/20 pending.  CTA chest from 3 days ago showed pancreatic neck mass    Assessment:  Landen Crawford is a 80 y.o. male with pancreatic mass    Patient Active Problem List   Diagnosis    HTN (hypertension), benign    Mixed hyperlipidemia    Diabetes mellitus type 2, uncontrolled ([address]    Lower leg DVT (deep venous thromboembolism), acute, right ([address]    History of CVA (cerebrovascular accident)    Dysphagia    Pancreatic mass       Plan:  Check triphasic cta of pancreas  After CTA, Ok to d/c home  Will schedule for outpatient EUS with possible biopsy  Ca 19-9 and chromogranin A pending. CEA 3.7. Time spent reviewing past medical, surgical, social and family history, vitals, nursing assessment and images. Time spent face to face with patient and family counciling and discussing care exceeded 50% of the time of the consult. Additional time spent reviewing images and labs, discussing case with nursing, support staff and other physicians; as well as coordinating care.         Physician Signature: Electronically signed by Dr. Haylee Ceballos

## 2020-07-31 NOTE — CARE COORDINATION
Spoke with patient with daughter, Makenzie Mcgrath at bedside. Family has arranged 24 hour care through [address] [place] [address] Drive, [address] (funded by [employer]) 21 hours a week and children pay for private duty or family stays the remaining time. Patient lives with spouse. He has a walker at home he uses for ambulation. Current arrangements are working well for patient and family. They plan to return home with no changes to current arrangements. Daughter will provide transport home. For questions I can be reached at [phone].  Josh Pandey [place]

## 2020-07-31 NOTE — PLAN OF CARE
Problem: Falls - Risk of:  Goal: Will remain free from falls  Description: Will remain free from falls  7/31/2020 1753 by Jose Caldwell RN  Outcome: Met This Shift  7/31/2020 1753 by Jose Caldwell RN  Outcome: Met This Shift  Goal: Absence of physical injury  Description: Absence of physical injury  7/31/2020 1753 by Jose Caldwell RN  Outcome: Met This Shift  7/31/2020 1753 by Jose Caldwell RN  Outcome: Met This Shift     Problem: Skin Integrity:  Goal: Will show no infection signs and symptoms  Description: Will show no infection signs and symptoms  7/31/2020 1753 by Jose Caldwell RN  Outcome: Met This Shift  7/31/2020 1753 by Jose Caldwell RN  Outcome: Met This Shift  Goal: Absence of new skin breakdown  Description: Absence of new skin breakdown  7/31/2020 1753 by Jose Caldwell RN  Outcome: Met This Shift  7/31/2020 1753 by Jose Caldwell RN  Outcome: Met This Shift     Problem: Breathing Pattern - Ineffective:  Goal: Ability to achieve and maintain a regular respiratory rate will improve  Description: Ability to achieve and maintain a regular respiratory rate will improve  Outcome: Met This Shift

## 2020-07-31 NOTE — H&P
Karine Smith M.D. History and Physical      CHIEF COMPLAINT: shortness of breath     Reason for Admission: pancreatic mass? History Obtained From: pt / daughter / emr     HISTORY OF PRESENT ILLNESS:      The patient is a 80 y.o. male of Bere Quan MD with significant past medical history of htn hld, dm,  who presents with complaints of sob that was observed by daughters. Pt was brought in to ed and noted to have possible pancreatic malignancy on Ct abdomen - he was therefore admitted. He has no complaints of abdominal pain , change in bowel habits or any other complaints. Family and pt are clear on not wanting any aggressive interventions done if this is a mass or malignancy. They would like to undertake comfort measures. /65   Pulse 66   Temp 97.6 °F (36.4 °C) (Oral)   Resp 18   Ht 5' 7\" (1.702 m)   Wt 175 lb (79.4 kg)   SpO2 99%   BMI 27.41 kg/m²   CT abdomen :Heterogeneous masslike density in the neck of the pancreas and smaller   one in the body concerning for developing malignancy. Surveillance   preferably by MRI is recommended. Past Medical History:        Diagnosis Date    Compression fracture     L1    Depression     [facility]     Diabetes mellitus [facility]     H/O blood clots     Heart murmur     Hyperlipidemia 5/8/2012    Hypertension 5/8/2012    Pneumothorax     ECF staff unable to confirm    Possible atherosclerotic coronary artery disease.  5/8/2012    Dr. Selena Figueredo Stroke [facility]cerebrum[facility])     Unspecified cerebral artery occlusion with cerebral infarction     slight right sided weakness     Past Surgical History:        Procedure Laterality Date    JOINT REPLACEMENT      right hip 4 times    KNEE SURGERY      both knees    OTHER SURGICAL HISTORY  11/5/2015    kyphoplasty-L1    TONSILLECTOMY           Medications Prior to Admission:    Medications Prior to Admission: apixaban (ELIQUIS) 5 MG TABS tablet, Take 1 tablet by Resp 18   Ht 5' 7\" (1.702 m)   Wt 175 lb (79.4 kg)   SpO2 99%   BMI 27.41 kg/m²     PHYSICAL EXAM:  General:  Awake, alert, oriented X 3. Well developed, well nourished, well groomed. No apparent distress. HEENT:  Normocephalic, atraumatic. Pupils equal, round, reactive to light. No scleral icterus. No conjunctival injection. Normal lips, teeth, and gums. No nasal discharge. Neck:  Supple  Heart:  RRR, no murmurs, gallops, rubs, carotid upstroke normal, no carotid bruits  Lungs:  CTA bilaterally, bilat symmetrical expansion, no wheeze, rales, or rhonchi  Abdomen: Bowel sounds present, soft, nontender, no masses, no organomegaly, no peritoneal signs  Extremities:  No clubbing, cyanosis, or edema  Skin:  Warm and dry, no open lesions or rash  Neuro:  Cranial nerves 2-12 intact, no focal deficits  Breast: deferred  Rectal: deferred  Genitalia:  deferred      DATA:     Recent Labs     07/30/20  1444 07/31/20  0449   WBC 9.9 8.5   HGB 12.9 13.1    156     Recent Labs     07/30/20  1444 07/31/20  0449    139   K 4.7 4.1   BUN 28* 26*   CREATININE 1.0 1.1     Recent Labs     07/30/20  1444   PROT 5.9*   INR 1.8     Recent Labs     07/30/20  1444   AST 21   ALT 22   ALKPHOS 72   BILITOT 0.5     No results for input(s): BNP in the last 72 hours. Recent Labs     07/30/20  1451   TROPONINI 0.02       ASSESSMENT:      Active Problems:    Diabetes mellitus type 2, uncontrolled (HCC)    Lower leg DVT (deep venous thromboembolism), acute, right (HCC)    History of CVA (cerebrovascular accident)    Pancreatic mass  Resolved Problems:    * No resolved hospital problems. *        PLAN:    Admitted for evaluation of ?  Pancreatic mass  Pt has no symptoms or complaints, mild sob  Po lasix X3 doses   Family and pt do not want aggressive intervention or chemo if this is a malignancy   Await Dr. Laci Morin input since they are already consulted   Resume home meds   Ok for diet     Ok for dc from medicine  if family decides to continue comfort measures after speaking w surgery     Dc plan - later today            Zoe Horton MD  7/31/2020  3:32 PM

## 2020-07-31 NOTE — PLAN OF CARE
Problem: Skin Integrity:  Goal: Will show no infection signs and symptoms  Description: Will show no infection signs and symptoms  Outcome: Met This Shift  Goal: Absence of new skin breakdown  Description: Absence of new skin breakdown  Outcome: Met This Shift Hemostasis: Electrocautery

## 2020-08-01 ENCOUNTER — APPOINTMENT (OUTPATIENT)
Dept: CT IMAGING | Age: 85
DRG: 436 | End: 2020-08-01
Payer: MEDICARE

## 2020-08-01 VITALS
TEMPERATURE: 98.5 F | BODY MASS INDEX: 27.47 KG/M2 | HEART RATE: 95 BPM | OXYGEN SATURATION: 93 % | WEIGHT: 175 LBS | SYSTOLIC BLOOD PRESSURE: 152 MMHG | RESPIRATION RATE: 18 BRPM | DIASTOLIC BLOOD PRESSURE: 90 MMHG | HEIGHT: 67 IN

## 2020-08-01 LAB
METER GLUCOSE: 153 MG/DL (ref 74–99)
METER GLUCOSE: 173 MG/DL (ref 74–99)

## 2020-08-01 PROCEDURE — 6360000002 HC RX W HCPCS: Performed by: INTERNAL MEDICINE

## 2020-08-01 PROCEDURE — 74175 CTA ABDOMEN W/CONTRAST: CPT

## 2020-08-01 PROCEDURE — 6360000004 HC RX CONTRAST MEDICATION: Performed by: RADIOLOGY

## 2020-08-01 PROCEDURE — 86301 IMMUNOASSAY TUMOR CA 19-9: CPT

## 2020-08-01 PROCEDURE — 82962 GLUCOSE BLOOD TEST: CPT

## 2020-08-01 PROCEDURE — 86316 IMMUNOASSAY TUMOR OTHER: CPT

## 2020-08-01 PROCEDURE — 36415 COLL VENOUS BLD VENIPUNCTURE: CPT

## 2020-08-01 PROCEDURE — 2580000003 HC RX 258: Performed by: INTERNAL MEDICINE

## 2020-08-01 PROCEDURE — 6370000000 HC RX 637 (ALT 250 FOR IP): Performed by: INTERNAL MEDICINE

## 2020-08-01 RX ORDER — FUROSEMIDE 20 MG/1
20 TABLET ORAL DAILY
Qty: 60 TABLET | Refills: 3 | Status: ON HOLD
Start: 2020-08-01 | End: 2020-08-15 | Stop reason: HOSPADM

## 2020-08-01 RX ORDER — SODIUM CHLORIDE 0.9 % (FLUSH) 0.9 %
10 SYRINGE (ML) INJECTION
Status: DISCONTINUED | OUTPATIENT
Start: 2020-08-01 | End: 2020-08-01 | Stop reason: SDUPTHER

## 2020-08-01 RX ADMIN — INSULIN LISPRO 1 UNITS: 100 INJECTION, SOLUTION INTRAVENOUS; SUBCUTANEOUS at 12:08

## 2020-08-01 RX ADMIN — ASPIRIN 81 MG CHEWABLE TABLET 81 MG: 81 TABLET CHEWABLE at 11:21

## 2020-08-01 RX ADMIN — Medication 10 ML: at 09:30

## 2020-08-01 RX ADMIN — IOPAMIDOL 90 ML: 755 INJECTION, SOLUTION INTRAVENOUS at 09:30

## 2020-08-01 RX ADMIN — METOPROLOL SUCCINATE 50 MG: 50 TABLET, EXTENDED RELEASE ORAL at 11:21

## 2020-08-01 RX ADMIN — FUROSEMIDE 40 MG: 40 TABLET ORAL at 13:50

## 2020-08-01 RX ADMIN — ESCITALOPRAM 10 MG: 10 TABLET, FILM COATED ORAL at 11:21

## 2020-08-01 RX ADMIN — HEPARIN SODIUM 5000 UNITS: 10000 INJECTION INTRAVENOUS; SUBCUTANEOUS at 06:03

## 2020-08-01 RX ADMIN — OXYBUTYNIN CHLORIDE 5 MG: 5 TABLET ORAL at 11:21

## 2020-08-01 ASSESSMENT — PAIN SCALES - GENERAL: PAINLEVEL_OUTOF10: 0

## 2020-08-01 NOTE — PLAN OF CARE
Problem: Falls - Risk of:  Goal: Will remain free from falls  Description: Will remain free from falls  7/31/2020 2345 by Taran Newton RN  Outcome: Met This Shift  7/31/2020 1753 by Lazarus Chawla RN  Outcome: Met This Shift  7/31/2020 1753 by Lazarus Chawla RN  Outcome: Met This Shift  Goal: Absence of physical injury  Description: Absence of physical injury  7/31/2020 2345 by Taran Newton RN  Outcome: Met This Shift  7/31/2020 1753 by Lazarus Chawla RN  Outcome: Met This Shift  7/31/2020 1753 by Lazarus Chawla RN  Outcome: Met This Shift     Problem: Skin Integrity:  Goal: Will show no infection signs and symptoms  Description: Will show no infection signs and symptoms  7/31/2020 1753 by Lazarus Chawla RN  Outcome: Met This Shift  7/31/2020 1753 by Lazarus Chawla RN  Outcome: Met This Shift  Goal: Absence of new skin breakdown  Description: Absence of new skin breakdown  7/31/2020 1753 by Lazarus Chawla RN  Outcome: Met This Shift  7/31/2020 1753 by Lazarus Chawla RN  Outcome: Met This Shift     Problem: Breathing Pattern - Ineffective:  Goal: Ability to achieve and maintain a regular respiratory rate will improve  Description: Ability to achieve and maintain a regular respiratory rate will improve  7/31/2020 1753 by Lazarus Chawla RN  Outcome: Met This Shift

## 2020-08-01 NOTE — PLAN OF CARE
Problem: Falls - Risk of:  Goal: Will remain free from falls  Description: Will remain free from falls  8/1/2020 0641 by Demarco Espana RN  Outcome: Met This Shift  7/31/2020 2345 by Demarco Espana RN  Outcome: Met This Shift  7/31/2020 1753 by Thelma Craft RN  Outcome: Met This Shift  7/31/2020 1753 by Thelma Craft RN  Outcome: Met This Shift  Goal: Absence of physical injury  Description: Absence of physical injury  8/1/2020 0641 by Demarco Espana RN  Outcome: Met This Shift  7/31/2020 2345 by Demarco Espana RN  Outcome: Met This Shift  7/31/2020 1753 by Thelma Craft RN  Outcome: Met This Shift  7/31/2020 1753 by Thelma Craft RN  Outcome: Met This Shift     Problem: Skin Integrity:  Goal: Will show no infection signs and symptoms  Description: Will show no infection signs and symptoms  7/31/2020 1753 by Thelma Craft RN  Outcome: Met This Shift  7/31/2020 1753 by Thelma Craft RN  Outcome: Met This Shift  Goal: Absence of new skin breakdown  Description: Absence of new skin breakdown  7/31/2020 1753 by Thelma Craft RN  Outcome: Met This Shift  7/31/2020 1753 by Thelma Craft RN  Outcome: Met This Shift     Problem: Breathing Pattern - Ineffective:  Goal: Ability to achieve and maintain a regular respiratory rate will improve  Description: Ability to achieve and maintain a regular respiratory rate will improve  7/31/2020 1753 by Thelma Craft RN  Outcome: Met This Shift

## 2020-08-01 NOTE — DISCHARGE SUMMARY
to           risk of dehydration in an elderly patient. Recent Labs     20  1444 20  0449   WBC 9.9 8.5   HGB 12.9 13.1   HCT 39.2 40.2    156       Recent Labs     20  1444 20  0449    139   K 4.7 4.1    103   CO2 23 24   BUN 28* 26*   CREATININE 1.0 1.1   CALCIUM 9.1 9.0       Xr Chest Portable    Result Date: 2020  Patient MRN:  [id number] : 1927 Age: 80 years Gender: Male Order Date:  2020 2:30 PM EXAM: XR CHEST PORTABLE INDICATION:  SOB SOB COMPARISON: 2019 FINDINGS:  Heart size is normal. There is tortuosity of the aorta. There are no infiltrates or effusions. There is elevation the right hemidiaphragm. There is some chronic pleural thickening laterally in the right lower hemithorax. There appear to be old healed right-sided rib fractures. There is vertebroplasty. No acute process     Cta Pulmonary W Contrast    Result Date: 2020  Patient MRN:  [id number] : 1927 Age: 80 years Gender: Male Order Date:  2020 3:15 PM EXAM: CTA PULMONARY W CONTRAST number of images 373 including MIP coronal and sagittal reconstruction images. Contrast. Isovue-370, 75 mL intravenously. Technique: Low-dose CT  acquisition technique included one of following options; 1 . Automated exposure control, 2. Adjustment of MA and or KV according to patient's size or 3. Use of iterative reconstruction. INDICATION: PE. Shortness of breath Comparison. None FINDINGS:  There is borderline cardiac size. The great vessels are normal. There is coronary artery calcification. The trachea and major bronchi are patent. There are no filling defects in the main pulmonary artery and the central branches. There is scarring and atelectasis in the lower lobes bilaterally and right upper lobe. Old bilateral rib fractures are identified. There is no focal consolidation. The liver is of normal architecture.  There is suggestion of a 1.6 x 2 cm heterogeneous hypodense mass in the neck of the pancreas and a smaller one measuring 9 mm in the body. Developing malignancy is considered. Large gallstone is identified in the neck of the gallbladder which is distended. No central pulmonary embolism or aortic dissection. Scarring and atelectasis in the lower lobes bilaterally and right middle lobe with old bilateral rib fractures. Heterogeneous masslike density in the neck of the pancreas and smaller one in the body concerning for developing malignancy. Surveillance preferably by MRI is recommended. ALERT:  THIS IS AN ABNORMAL REPORT       Discharge Exam:    HEENT: NCAT,  PERRLA, No JVD  Heart:  RRR, no murmurs, gallops, or rubs.   Lungs:  CTA bilaterally, no wheeze, rales or rhonchi  Abd: bowel sounds present, nontender, nondistended, no masses  Extrem:  No clubbing, cyanosis, or edema    Disposition: home     Patient Condition at Discharge: Stable    Patient Instructions:      Medication List      START taking these medications    furosemide 20 MG tablet  Commonly known as:  Lasix  Take 1 tablet by mouth daily        CONTINUE taking these medications    apixaban 5 MG Tabs tablet  Commonly known as:  ELIQUIS  Take 1 tablet by mouth 2 times daily     aspirin 81 MG tablet     atorvastatin 10 MG tablet  Commonly known as:  LIPITOR     escitalopram 10 MG tablet  Commonly known as:  LEXAPRO     glimepiride 4 MG tablet  Commonly known as:  AMARYL  Take 2 tablets by mouth daily (with breakfast)     insulin detemir 100 UNIT/ML injection pen  Commonly known as:  Levemir FlexTouch  Inject 12 Units into the skin every evening     ketoconazole 2 % cream  Commonly known as:  NIZORAL     linagliptin 5 MG tablet  Commonly known as:  TRADJENTA     metoprolol succinate 50 MG extended release tablet  Commonly known as:  TOPROL XL     MULTIVITAMIN ADULT PO     naproxen 500 MG tablet  Commonly known as:  Naprosyn  Take 1 tablet by mouth 2 times daily (with meals)     oxybutynin 5 MG tablet  Commonly known as: DITROPAN     Vitamin D3 125 MCG (5000 UT) Tabs     vitamin E 400 UNIT capsule           Where to Get Your Medications      These medications were sent to [facility] [address]  [address] [address]    Phone:  [phone]   · furosemide 20 MG tablet       Activity: activity as tolerated  Diet: regular diet    Pt has been advised to: Follow-up with Leo Miles MD in 1 week.   Follow-up with consultants as recommended by them    Note that over 30 minutes was spent in preparing discharge papers, discussing discharge with patient, medication review, etc.    Signed:  Dontrell Perrin MD  8/1/2020  12:21 PM

## 2020-08-03 LAB — CA 19-9: 2 U/ML (ref 0–37)

## 2020-08-04 LAB
CA 19-9: 2 U/ML (ref 0–37)
CHROMOGRANIN A: 114 NG/ML (ref 0–160)
CHROMOGRANIN A: 130 NG/ML (ref 0–160)

## 2020-08-06 ENCOUNTER — TELEPHONE (OUTPATIENT)
Dept: SURGERY | Age: 85
End: 2020-08-06

## 2020-08-13 ENCOUNTER — APPOINTMENT (OUTPATIENT)
Dept: GENERAL RADIOLOGY | Age: 85
DRG: 291 | End: 2020-08-13
Payer: MEDICARE

## 2020-08-13 ENCOUNTER — HOSPITAL ENCOUNTER (INPATIENT)
Age: 85
LOS: 2 days | Discharge: HOME | DRG: 291 | End: 2020-08-15
Attending: EMERGENCY MEDICINE | Admitting: INTERNAL MEDICINE
Payer: MEDICARE

## 2020-08-13 PROBLEM — I50.9 CONGESTIVE HEART FAILURE (HCC): Status: ACTIVE | Noted: 2020-08-13

## 2020-08-13 LAB
ALBUMIN SERPL-MCNC: 3 G/DL (ref 3.5–5.2)
ALP BLD-CCNC: 60 U/L (ref 40–129)
ALT SERPL-CCNC: 13 U/L (ref 0–40)
ANION GAP SERPL CALCULATED.3IONS-SCNC: 7 MMOL/L (ref 7–16)
APTT: 28.4 SEC (ref 24.5–35.1)
AST SERPL-CCNC: 21 U/L (ref 0–39)
BACTERIA: NORMAL /HPF
BASOPHILS ABSOLUTE: 0.05 E9/L (ref 0–0.2)
BASOPHILS RELATIVE PERCENT: 0.5 % (ref 0–2)
BILIRUB SERPL-MCNC: 0.6 MG/DL (ref 0–1.2)
BILIRUBIN URINE: NEGATIVE
BLOOD, URINE: ABNORMAL
BUN BLDV-MCNC: 23 MG/DL (ref 8–23)
CALCIUM SERPL-MCNC: 8.6 MG/DL (ref 8.6–10.2)
CHLORIDE BLD-SCNC: 103 MMOL/L (ref 98–107)
CLARITY: CLEAR
CO2: 25 MMOL/L (ref 22–29)
COLOR: YELLOW
CREAT SERPL-MCNC: 1 MG/DL (ref 0.7–1.2)
EKG ATRIAL RATE: 59 BPM
EKG P AXIS: 20 DEGREES
EKG P-R INTERVAL: 188 MS
EKG Q-T INTERVAL: 434 MS
EKG QRS DURATION: 124 MS
EKG QTC CALCULATION (BAZETT): 429 MS
EKG R AXIS: -33 DEGREES
EKG T AXIS: -26 DEGREES
EKG VENTRICULAR RATE: 59 BPM
EOSINOPHILS ABSOLUTE: 0.03 E9/L (ref 0.05–0.5)
EOSINOPHILS RELATIVE PERCENT: 0.3 % (ref 0–6)
EPITHELIAL CELLS, UA: NORMAL /HPF
GFR AFRICAN AMERICAN: >60
GFR NON-AFRICAN AMERICAN: >60 ML/MIN/1.73
GLUCOSE BLD-MCNC: 269 MG/DL (ref 74–99)
GLUCOSE URINE: 100 MG/DL
HCT VFR BLD CALC: 34.7 % (ref 37–54)
HEMOGLOBIN: 11.6 G/DL (ref 12.5–16.5)
IMMATURE GRANULOCYTES #: 0.03 E9/L
IMMATURE GRANULOCYTES %: 0.3 % (ref 0–5)
INR BLD: 2.2
KETONES, URINE: NEGATIVE MG/DL
LEUKOCYTE ESTERASE, URINE: NEGATIVE
LYMPHOCYTES ABSOLUTE: 0.9 E9/L (ref 1.5–4)
LYMPHOCYTES RELATIVE PERCENT: 9.7 % (ref 20–42)
MAGNESIUM: 1.7 MG/DL (ref 1.6–2.6)
MCH RBC QN AUTO: 30.8 PG (ref 26–35)
MCHC RBC AUTO-ENTMCNC: 33.4 % (ref 32–34.5)
MCV RBC AUTO: 92 FL (ref 80–99.9)
MONOCYTES ABSOLUTE: 1.15 E9/L (ref 0.1–0.95)
MONOCYTES RELATIVE PERCENT: 12.4 % (ref 2–12)
NEUTROPHILS ABSOLUTE: 7.09 E9/L (ref 1.8–7.3)
NEUTROPHILS RELATIVE PERCENT: 76.8 % (ref 43–80)
NITRITE, URINE: NEGATIVE
PDW BLD-RTO: 13.7 FL (ref 11.5–15)
PH UA: 6 (ref 5–9)
PLATELET # BLD: 158 E9/L (ref 130–450)
PMV BLD AUTO: 10.1 FL (ref 7–12)
POTASSIUM SERPL-SCNC: 4.4 MMOL/L (ref 3.5–5)
PRO-BNP: 1845 PG/ML (ref 0–450)
PROTEIN UA: NEGATIVE MG/DL
PROTHROMBIN TIME: 26.2 SEC (ref 9.3–12.4)
RBC # BLD: 3.77 E12/L (ref 3.8–5.8)
RBC UA: NORMAL /HPF (ref 0–2)
SODIUM BLD-SCNC: 135 MMOL/L (ref 132–146)
SPECIFIC GRAVITY UA: 1.02 (ref 1–1.03)
TOTAL PROTEIN: 5.7 G/DL (ref 6.4–8.3)
TROPONIN: 0.02 NG/ML (ref 0–0.03)
UROBILINOGEN, URINE: 1 E.U./DL
WBC # BLD: 9.3 E9/L (ref 4.5–11.5)
WBC UA: NORMAL /HPF (ref 0–5)

## 2020-08-13 PROCEDURE — 85610 PROTHROMBIN TIME: CPT

## 2020-08-13 PROCEDURE — 84484 ASSAY OF TROPONIN QUANT: CPT

## 2020-08-13 PROCEDURE — 99284 EMERGENCY DEPT VISIT MOD MDM: CPT

## 2020-08-13 PROCEDURE — 2580000003 HC RX 258: Performed by: INTERNAL MEDICINE

## 2020-08-13 PROCEDURE — 85025 COMPLETE CBC W/AUTO DIFF WBC: CPT

## 2020-08-13 PROCEDURE — 2060000000 HC ICU INTERMEDIATE R&B

## 2020-08-13 PROCEDURE — 6370000000 HC RX 637 (ALT 250 FOR IP): Performed by: INTERNAL MEDICINE

## 2020-08-13 PROCEDURE — 93010 ELECTROCARDIOGRAM REPORT: CPT | Performed by: INTERNAL MEDICINE

## 2020-08-13 PROCEDURE — 82962 GLUCOSE BLOOD TEST: CPT

## 2020-08-13 PROCEDURE — 93005 ELECTROCARDIOGRAM TRACING: CPT | Performed by: NURSE PRACTITIONER

## 2020-08-13 PROCEDURE — 81001 URINALYSIS AUTO W/SCOPE: CPT

## 2020-08-13 PROCEDURE — 85730 THROMBOPLASTIN TIME PARTIAL: CPT

## 2020-08-13 PROCEDURE — 80053 COMPREHEN METABOLIC PANEL: CPT

## 2020-08-13 PROCEDURE — 83880 ASSAY OF NATRIURETIC PEPTIDE: CPT

## 2020-08-13 PROCEDURE — 83735 ASSAY OF MAGNESIUM: CPT

## 2020-08-13 PROCEDURE — 99285 EMERGENCY DEPT VISIT HI MDM: CPT

## 2020-08-13 PROCEDURE — 96374 THER/PROPH/DIAG INJ IV PUSH: CPT

## 2020-08-13 PROCEDURE — 6360000002 HC RX W HCPCS: Performed by: NURSE PRACTITIONER

## 2020-08-13 PROCEDURE — 71046 X-RAY EXAM CHEST 2 VIEWS: CPT

## 2020-08-13 RX ORDER — POLYETHYLENE GLYCOL 3350 17 G/17G
17 POWDER, FOR SOLUTION ORAL DAILY PRN
Status: DISCONTINUED | OUTPATIENT
Start: 2020-08-13 | End: 2020-08-15 | Stop reason: HOSPADM

## 2020-08-13 RX ORDER — PROMETHAZINE HYDROCHLORIDE 25 MG/1
12.5 TABLET ORAL EVERY 6 HOURS PRN
Status: DISCONTINUED | OUTPATIENT
Start: 2020-08-13 | End: 2020-08-15 | Stop reason: HOSPADM

## 2020-08-13 RX ORDER — OXYBUTYNIN CHLORIDE 5 MG/1
5 TABLET ORAL EVERY MORNING
Status: DISCONTINUED | OUTPATIENT
Start: 2020-08-14 | End: 2020-08-15 | Stop reason: HOSPADM

## 2020-08-13 RX ORDER — ESCITALOPRAM OXALATE 10 MG/1
10 TABLET ORAL DAILY
Status: DISCONTINUED | OUTPATIENT
Start: 2020-08-14 | End: 2020-08-15 | Stop reason: HOSPADM

## 2020-08-13 RX ORDER — DEXTROSE MONOHYDRATE 25 G/50ML
12.5 INJECTION, SOLUTION INTRAVENOUS PRN
Status: DISCONTINUED | OUTPATIENT
Start: 2020-08-13 | End: 2020-08-15 | Stop reason: HOSPADM

## 2020-08-13 RX ORDER — CHOLECALCIFEROL (VITAMIN D3) 50 MCG
5000 TABLET ORAL DAILY
Status: DISCONTINUED | OUTPATIENT
Start: 2020-08-14 | End: 2020-08-15 | Stop reason: HOSPADM

## 2020-08-13 RX ORDER — ATORVASTATIN CALCIUM 10 MG/1
10 TABLET, FILM COATED ORAL NIGHTLY
Status: DISCONTINUED | OUTPATIENT
Start: 2020-08-13 | End: 2020-08-15 | Stop reason: HOSPADM

## 2020-08-13 RX ORDER — SODIUM CHLORIDE 0.9 % (FLUSH) 0.9 %
10 SYRINGE (ML) INJECTION EVERY 12 HOURS SCHEDULED
Status: DISCONTINUED | OUTPATIENT
Start: 2020-08-13 | End: 2020-08-15 | Stop reason: HOSPADM

## 2020-08-13 RX ORDER — ASPIRIN 81 MG/1
81 TABLET ORAL EVERY MORNING
Status: DISCONTINUED | OUTPATIENT
Start: 2020-08-14 | End: 2020-08-15 | Stop reason: HOSPADM

## 2020-08-13 RX ORDER — ONDANSETRON 2 MG/ML
4 INJECTION INTRAMUSCULAR; INTRAVENOUS EVERY 6 HOURS PRN
Status: DISCONTINUED | OUTPATIENT
Start: 2020-08-13 | End: 2020-08-15 | Stop reason: HOSPADM

## 2020-08-13 RX ORDER — LISINOPRIL 5 MG/1
5 TABLET ORAL DAILY
Status: DISCONTINUED | OUTPATIENT
Start: 2020-08-14 | End: 2020-08-14

## 2020-08-13 RX ORDER — FUROSEMIDE 10 MG/ML
20 INJECTION INTRAMUSCULAR; INTRAVENOUS ONCE
Status: COMPLETED | OUTPATIENT
Start: 2020-08-13 | End: 2020-08-13

## 2020-08-13 RX ORDER — INSULIN GLARGINE 100 [IU]/ML
12 INJECTION, SOLUTION SUBCUTANEOUS NIGHTLY
Status: DISCONTINUED | OUTPATIENT
Start: 2020-08-13 | End: 2020-08-15 | Stop reason: HOSPADM

## 2020-08-13 RX ORDER — DUTASTERIDE 0.5 MG/1
0.5 CAPSULE, LIQUID FILLED ORAL DAILY
COMMUNITY

## 2020-08-13 RX ORDER — METOPROLOL SUCCINATE 50 MG/1
50 TABLET, EXTENDED RELEASE ORAL EVERY MORNING
Status: DISCONTINUED | OUTPATIENT
Start: 2020-08-14 | End: 2020-08-15 | Stop reason: HOSPADM

## 2020-08-13 RX ORDER — DEXTROSE MONOHYDRATE 50 MG/ML
100 INJECTION, SOLUTION INTRAVENOUS PRN
Status: DISCONTINUED | OUTPATIENT
Start: 2020-08-13 | End: 2020-08-15 | Stop reason: HOSPADM

## 2020-08-13 RX ORDER — NICOTINE POLACRILEX 4 MG
15 LOZENGE BUCCAL PRN
Status: DISCONTINUED | OUTPATIENT
Start: 2020-08-13 | End: 2020-08-15 | Stop reason: HOSPADM

## 2020-08-13 RX ORDER — ACETAMINOPHEN 325 MG/1
650 TABLET ORAL EVERY 6 HOURS PRN
Status: DISCONTINUED | OUTPATIENT
Start: 2020-08-13 | End: 2020-08-15 | Stop reason: HOSPADM

## 2020-08-13 RX ORDER — SODIUM CHLORIDE 0.9 % (FLUSH) 0.9 %
10 SYRINGE (ML) INJECTION PRN
Status: DISCONTINUED | OUTPATIENT
Start: 2020-08-13 | End: 2020-08-15 | Stop reason: HOSPADM

## 2020-08-13 RX ORDER — ALOGLIPTIN 12.5 MG/1
12.5 TABLET, FILM COATED ORAL DAILY
Status: DISCONTINUED | OUTPATIENT
Start: 2020-08-14 | End: 2020-08-15 | Stop reason: HOSPADM

## 2020-08-13 RX ORDER — TAMSULOSIN HYDROCHLORIDE 0.4 MG/1
0.4 CAPSULE ORAL DAILY
COMMUNITY

## 2020-08-13 RX ORDER — ACETAMINOPHEN 650 MG/1
650 SUPPOSITORY RECTAL EVERY 6 HOURS PRN
Status: DISCONTINUED | OUTPATIENT
Start: 2020-08-13 | End: 2020-08-15 | Stop reason: HOSPADM

## 2020-08-13 RX ORDER — VITAMIN E 268 MG
400 CAPSULE ORAL EVERY MORNING
Status: DISCONTINUED | OUTPATIENT
Start: 2020-08-14 | End: 2020-08-15 | Stop reason: HOSPADM

## 2020-08-13 RX ADMIN — FUROSEMIDE 20 MG: 10 INJECTION, SOLUTION INTRAVENOUS at 21:15

## 2020-08-13 RX ADMIN — INSULIN LISPRO 1 UNITS: 100 INJECTION, SOLUTION INTRAVENOUS; SUBCUTANEOUS at 23:58

## 2020-08-13 RX ADMIN — APIXABAN 5 MG: 5 TABLET, FILM COATED ORAL at 23:49

## 2020-08-13 RX ADMIN — Medication 10 ML: at 23:49

## 2020-08-13 RX ADMIN — INSULIN GLARGINE 12 UNITS: 100 INJECTION, SOLUTION SUBCUTANEOUS at 23:58

## 2020-08-13 RX ADMIN — ATORVASTATIN CALCIUM 10 MG: 10 TABLET, FILM COATED ORAL at 23:49

## 2020-08-13 ASSESSMENT — PAIN SCALES - GENERAL
PAINLEVEL_OUTOF10: 0
PAINLEVEL_OUTOF10: 0

## 2020-08-13 NOTE — ED NOTES
Bed: 27  Expected date:   Expected time:   Means of arrival:   Comments:  EMS     Laisha Castaneda RN  08/13/20 5446

## 2020-08-13 NOTE — LETTER
Beneficiary Notification Letter  BPCI Advanced     Your Doctor or [address],    We wanted to let you know that your health care provider, [address] [place] [address] Patricia, has volunteered to take part in our [facility] for [facility] Medicaid Services (CMS) Bundled Payments for [facility] (BPCI Advanced). This doesnt change your Medicare rights or benefits and you dont need to do anything. What are bundled payments? A bundled payment combines, or bundles together, payments that Medicare makes to your health care providers for the many different kinds of medical services you might get in a specific time period. In BPCI Advanced, this time period could include a hospital inpatient stay or outpatient procedure, plus 90 days. Why would Medicare bundle payments? Bundled payments are thought of as a value-based way to pay because health care providers are responsible for both the quality and cost of medical care they give. This is a relatively new way of paying health care providers compared to thefee-for-service way Medicare has traditionally paid, where providers are paid separately for each service they provide. Bundled payments encourage these providers to work together to provide better, more coordinated care during your hospital stay, or outpatient procedure, and through your recovery. What does BPCI Advance mean for me? Youre more likely to get even better care when hospitals, doctors, and other health care providers work together. In BPCI Advanced, hospitals, doctors, and other health care providers may be rewarded for providing better, more coordinated health care. Medicare will watch BPCI Advanced participants closely to make sure that you and other patients keep getting efficient, high quality care. What do I need to know about BPCI Advanced? Whats most important for you to know is that your Medicare rights and benefits wont change because your health care provider is participating in [address]. Medicare will keep covering all of your medically necessary services. Even though Medicare will pay your doctor in a different way under BPCI Advanced, how much you have to pay wont change. Health care providers and suppliers who are enrolled in Medicare will submit their Medicare claims like they always have. Youll have all the same Medicare rights and protections, including the right to choose which hospital, doctor, or other health care provider you see. If you dont want to get care from a health care provider whos participating in [address], then youll have to choose a different health care provider whos not participating in the Model. How can I give feedback about my health care? Medicare might ask you to take a voluntary survey about the services and care you received from [address] [place] [address] during your hospital stay or outpatient procedure and for a specific period of time afterwards. You can decide whether you want to take the voluntary survey, but if you do, itll help Medicare make BPCI Advanced and the care of other Medicare patients better. If you have concerns or complaints about your care, you can:   · Talk to your doctor or health care provider. · Contact your Beneficiary and Family Centered Care Quality Improvement   Organization [employer]). You can get your BFCC-QIOs phone number  at  Medicare.gov/contacts or by calling 1-800-MEDICARE. TTY users can call  1-808.931.1808. Where can I learn more about BPCI Advanced? Learn more about BPCI Advanced at https://innovation.cms.gov/initiatives/bpci-advanced/:  · A list of all the hospitals and physician group practices in the country participating in [address]. · All of the inpatient and outpatient Clinical Episodes that are currently included under BPCI Advanced. A Clinical Episode is a grouping of medical conditions or diagnoses that are included in the [address].

## 2020-08-13 NOTE — ED PROVIDER NOTES
ED Attending  CC: No    HPI:  8/13/20, Time: 7:32 PM EDT         Efraín Estrella is a 80 y.o. male presenting to the ED for sob, beginning few days ago. The complaint has been constant, mild in severity, and worsened by nothing. Complaining of shortness of breath which she states is been present for the past several weeks. States last few days shortness of breath is become worse. The worse with ambulation. He has no orthopnea. He denies any history of congestive heart failure. He is on Eliquis. He does not follow with cardiology on a regular basis. He denies any chest pain. Denies any recent cough or cold symptoms. ROS:   Pertinent positives and negatives are stated within HPI, all other systems reviewed and are negative.  --------------------------------------------- PAST HISTORY ---------------------------------------------  Past Medical History:  has a past medical history of Compression fracture, Depression, Diabetes ([facility] [address] Diabetes mellitus ([facility] [address] H/O blood clots, Heart murmur, Hyperlipidemia, Hypertension, Pneumothorax, Possible atherosclerotic coronary artery disease., Stroke (cerebrum) ([facility] [address] and Unspecified cerebral artery occlusion with cerebral infarction. Past Surgical History:  has a past surgical history that includes knee surgery; Tonsillectomy; joint replacement; and other surgical history (11/5/2015). Social History:  reports that he quit smoking about 47 years ago. His smoking use included cigarettes. He has never used smokeless tobacco. He reports that he does not drink alcohol or use drugs. Family History: family history includes Heart Disease in his mother. The patients home medications have been reviewed.     Allergies: Bupivacaine; Niaspan [niacin er]; and Vicodin [hydrocodone-acetaminophen]    ---------------------------------------------------PHYSICAL EXAM--------------------------------------    Constitutional/General: Alert and oriented x3, well appearing, non toxic in NAD  Head: Normocephalic and atraumatic  Eyes: PERRL, EOMI  Mouth: Oropharynx clear, handling secretions, no trismus  Neck: Supple, full ROM, non tender to palpation in the midline, no stridor, no crepitus, no meningeal signs  Pulmonary: Lungs clear to auscultation bilaterally, no wheezes, rales, or rhonchi. Not in respiratory distress  Cardiovascular:  Regular rate. Regular rhythm. No murmurs, gallops, or rubs. 2+ distal pulses  Chest: no chest wall tenderness  Abdomen: Soft. Non tender. Non distended. +BS. No rebound, guarding, or rigidity. No pulsatile masses appreciated. Musculoskeletal: Moves all extremities x 4. Warm and well perfused, no clubbing, cyanosis, trace edema noted. Capillary refill <3 seconds  Skin: warm and dry. No rashes. Neurologic: GCS 15, CN 2-12 grossly intact, no focal deficits, symmetric strength 5/5 in the upper and lower extremities bilaterally  Psych: Normal Affect    -------------------------------------------------- RESULTS -------------------------------------------------  I have personally reviewed all laboratory and imaging results for this patient. Results are listed below.      LABS:  Results for orders placed or performed during the hospital encounter of 08/13/20   Troponin   Result Value Ref Range    Troponin 0.02 0.00 - 0.03 ng/mL   CBC Auto Differential   Result Value Ref Range    WBC 9.3 4.5 - 11.5 E9/L    RBC 3.77 (L) 3.80 - 5.80 E12/L    Hemoglobin 11.6 (L) 12.5 - 16.5 g/dL    Hematocrit 34.7 (L) 37.0 - 54.0 %    MCV 92.0 80.0 - 99.9 fL    MCH 30.8 26.0 - 35.0 pg    MCHC 33.4 32.0 - 34.5 %    RDW 13.7 11.5 - 15.0 fL    Platelets 031 289 - 442 E9/L    MPV 10.1 7.0 - 12.0 fL    Neutrophils % 76.8 43.0 - 80.0 %    Immature Granulocytes % 0.3 0.0 - 5.0 %    Lymphocytes % 9.7 (L) 20.0 - 42.0 %    Monocytes % 12.4 (H) 2.0 - 12.0 %    Eosinophils % 0.3 0.0 - 6.0 %    Basophils % 0.5 0.0 - 2.0 %    Neutrophils Absolute 7.09 1.80 - 7.30 E9/L    Immature Granulocytes # 0.03 E9/L    Lymphocytes Absolute 0.90 (L) 1.50 - 4.00 E9/L    Monocytes Absolute 1.15 (H) 0.10 - 0.95 E9/L    Eosinophils Absolute 0.03 (L) 0.05 - 0.50 E9/L    Basophils Absolute 0.05 0.00 - 0.20 E9/L   Comprehensive Metabolic Panel   Result Value Ref Range    Sodium 135 132 - 146 mmol/L    Potassium 4.4 3.5 - 5.0 mmol/L    Chloride 103 98 - 107 mmol/L    CO2 25 22 - 29 mmol/L    Anion Gap 7 7 - 16 mmol/L    Glucose 269 (H) 74 - 99 mg/dL    BUN 23 8 - 23 mg/dL    CREATININE 1.0 0.7 - 1.2 mg/dL    GFR Non-African American >60 >=60 mL/min/1.73    GFR African American >60     Calcium 8.6 8.6 - 10.2 mg/dL    Total Protein 5.7 (L) 6.4 - 8.3 g/dL    Alb 3.0 (L) 3.5 - 5.2 g/dL    Total Bilirubin 0.6 0.0 - 1.2 mg/dL    Alkaline Phosphatase 60 40 - 129 U/L    ALT 13 0 - 40 U/L    AST 21 0 - 39 U/L   Brain Natriuretic Peptide   Result Value Ref Range    Pro-BNP 1,845 (H) 0 - 450 pg/mL   Magnesium   Result Value Ref Range    Magnesium 1.7 1.6 - 2.6 mg/dL   Urinalysis   Result Value Ref Range    Color, UA Yellow Straw/Yellow    Clarity, UA Clear Clear    Glucose, Ur 100 (A) Negative mg/dL    Bilirubin Urine Negative Negative    Ketones, Urine Negative Negative mg/dL    Specific Gravity, UA 1.020 1.005 - 1.030    Blood, Urine MODERATE (A) Negative    pH, UA 6.0 5.0 - 9.0    Protein, UA Negative Negative mg/dL    Urobilinogen, Urine 1.0 <2.0 E.U./dL    Nitrite, Urine Negative Negative    Leukocyte Esterase, Urine Negative Negative   Protime-INR   Result Value Ref Range    Protime 26.2 (H) 9.3 - 12.4 sec    INR 2.2    APTT   Result Value Ref Range    aPTT 28.4 24.5 - 35.1 sec   Microscopic Urinalysis   Result Value Ref Range    WBC, UA 1-3 0 - 5 /HPF    RBC, UA 2-5 0 - 2 /HPF    Epithelial Cells, UA RARE /HPF    Bacteria, UA NONE SEEN None Seen /HPF   Troponin   Result Value Ref Range    Troponin <0.01 0.00 - 0.03 ng/mL   POCT Glucose   Result Value Ref Range    Meter Glucose 214 (H) 74 - 99 mg/dL   EKG 12 Lead   Result Value Ref Range    Ventricular Rate 59 BPM    Atrial Rate 59 BPM    P-R Interval 188 ms    QRS Duration 124 ms    Q-T Interval 434 ms    QTc Calculation (Bazett) 429 ms    P Axis 20 degrees    R Axis -33 degrees    T Axis -26 degrees       RADIOLOGY:  Interpreted by Radiologist.  XR CHEST (2 VW)   Final Result   Minimal right basilar airspace disease, likely atelectasis, without   significant pleural effusion. Cannot completely exclude aspiration   and/or infection in the right clinical setting. EKG Interpretation  Interpreted by emergency department physician    Rhythm: sinus bradycardia  Rate: 50-60  Axis: left  Conduction: right bundle branch block (complete)  ST Segments: no acute change  T Waves: no acute change    Clinical Impression: no acute changes  Comparison to prior EKG: stable as compared to patient's most recent EKG      ------------------------- NURSING NOTES AND VITALS REVIEWED ---------------------------   The nursing notes within the ED encounter and vital signs as below have been reviewed by myself. /70   Pulse 64   Temp 97.7 °F (36.5 °C) (Oral)   Resp 18   Ht 5' 7\" (1.702 m)   Wt 182 lb 4.8 oz (82.7 kg)   SpO2 98%   BMI 28.55 kg/m²   Oxygen Saturation Interpretation: Normal    The patients available past medical records and past encounters were reviewed.         ------------------------------ ED COURSE/MEDICAL DECISION MAKING----------------------  Medications   apixaban (ELIQUIS) tablet 5 mg (5 mg Oral Given 8/13/20 2349)   aspirin EC tablet 81 mg (has no administration in time range)   atorvastatin (LIPITOR) tablet 10 mg (10 mg Oral Given 8/13/20 2349)   vitamin D (CHOLECALCIFEROL) tablet 5,000 Units (has no administration in time range)   escitalopram (LEXAPRO) tablet 10 mg (has no administration in time range)   alogliptin (NESINA) tablet 12.5 mg (has no administration in time range)   metoprolol succinate (TOPROL XL) extended release tablet 50 mg (has no administration in time range)   oxybutynin (DITROPAN) tablet 5 mg (has no administration in time range)   vitamin E capsule 400 Units (has no administration in time range)   sodium chloride flush 0.9 % injection 10 mL (10 mLs Intravenous Given 20)   sodium chloride flush 0.9 % injection 10 mL (has no administration in time range)   acetaminophen (TYLENOL) tablet 650 mg (has no administration in time range)     Or   acetaminophen (TYLENOL) suppository 650 mg (has no administration in time range)   polyethylene glycol (GLYCOLAX) packet 17 g (has no administration in time range)   promethazine (PHENERGAN) tablet 12.5 mg (has no administration in time range)     Or   ondansetron (ZOFRAN) injection 4 mg (has no administration in time range)   lisinopril (PRINIVIL;ZESTRIL) tablet 5 mg (has no administration in time range)   insulin lispro (HUMALOG) injection vial 0-12 Units (has no administration in time range)   insulin lispro (HUMALOG) injection vial 0-6 Units (1 Units Subcutaneous Given 20)   glucose (GLUTOSE) 40 % oral gel 15 g (has no administration in time range)   dextrose 50 % IV solution (has no administration in time range)   glucagon (rDNA) injection 1 mg (has no administration in time range)   dextrose 5 % solution (has no administration in time range)   insulin glargine (LANTUS) injection vial 12 Units (12 Units Subcutaneous Given 20)   furosemide (LASIX) injection 20 mg (20 mg Intravenous Given 20)             Medical Decision Makin- exam by Dr. Elizabeth Doan, will continue to monitor and reevaluate. -call to Dr. Rodney Luna, update on patient's clinical presentation physical exam diagnostic results and abnormal findings including an elevated BNP concerning for a congestive heart failure. The patient is resting comfortably with normal vital signs and denies any shortness of breath at rest however does have dyspnea with minimal exertion.   Plan will be for admission to telemetry

## 2020-08-14 LAB
ANION GAP SERPL CALCULATED.3IONS-SCNC: 8 MMOL/L (ref 7–16)
BASOPHILS ABSOLUTE: 0.06 E9/L (ref 0–0.2)
BASOPHILS RELATIVE PERCENT: 0.7 % (ref 0–2)
BUN BLDV-MCNC: 26 MG/DL (ref 8–23)
CALCIUM SERPL-MCNC: 8.5 MG/DL (ref 8.6–10.2)
CHLORIDE BLD-SCNC: 105 MMOL/L (ref 98–107)
CHOLESTEROL, TOTAL: 97 MG/DL (ref 0–199)
CO2: 24 MMOL/L (ref 22–29)
CREAT SERPL-MCNC: 1.2 MG/DL (ref 0.7–1.2)
D DIMER: 1022 NG/ML DDU
EOSINOPHILS ABSOLUTE: 0.13 E9/L (ref 0.05–0.5)
EOSINOPHILS RELATIVE PERCENT: 1.6 % (ref 0–6)
GFR AFRICAN AMERICAN: >60
GFR NON-AFRICAN AMERICAN: 57 ML/MIN/1.73
GLUCOSE BLD-MCNC: 166 MG/DL (ref 74–99)
HCT VFR BLD CALC: 34.7 % (ref 37–54)
HDLC SERPL-MCNC: 33 MG/DL
HEMOGLOBIN: 11.3 G/DL (ref 12.5–16.5)
IMMATURE GRANULOCYTES #: 0.05 E9/L
IMMATURE GRANULOCYTES %: 0.6 % (ref 0–5)
LDL CHOLESTEROL CALCULATED: 52 MG/DL (ref 0–99)
LV EF: 58 %
LVEF MODALITY: NORMAL
LYMPHOCYTES ABSOLUTE: 1.37 E9/L (ref 1.5–4)
LYMPHOCYTES RELATIVE PERCENT: 16.7 % (ref 20–42)
MAGNESIUM: 1.8 MG/DL (ref 1.6–2.6)
MCH RBC QN AUTO: 30.5 PG (ref 26–35)
MCHC RBC AUTO-ENTMCNC: 32.6 % (ref 32–34.5)
MCV RBC AUTO: 93.5 FL (ref 80–99.9)
METER GLUCOSE: 104 MG/DL (ref 74–99)
METER GLUCOSE: 163 MG/DL (ref 74–99)
METER GLUCOSE: 207 MG/DL (ref 74–99)
METER GLUCOSE: 214 MG/DL (ref 74–99)
METER GLUCOSE: 230 MG/DL (ref 74–99)
MONOCYTES ABSOLUTE: 1.14 E9/L (ref 0.1–0.95)
MONOCYTES RELATIVE PERCENT: 13.9 % (ref 2–12)
NEUTROPHILS ABSOLUTE: 5.44 E9/L (ref 1.8–7.3)
NEUTROPHILS RELATIVE PERCENT: 66.5 % (ref 43–80)
PDW BLD-RTO: 13.9 FL (ref 11.5–15)
PLATELET # BLD: 165 E9/L (ref 130–450)
PMV BLD AUTO: 10.8 FL (ref 7–12)
POTASSIUM SERPL-SCNC: 4.2 MMOL/L (ref 3.5–5)
RBC # BLD: 3.71 E12/L (ref 3.8–5.8)
SODIUM BLD-SCNC: 137 MMOL/L (ref 132–146)
TRIGL SERPL-MCNC: 59 MG/DL (ref 0–149)
TROPONIN: 0.02 NG/ML (ref 0–0.03)
TROPONIN: <0.01 NG/ML (ref 0–0.03)
TSH SERPL DL<=0.05 MIU/L-ACNC: 4.97 UIU/ML (ref 0.27–4.2)
VLDLC SERPL CALC-MCNC: 12 MG/DL
WBC # BLD: 8.2 E9/L (ref 4.5–11.5)

## 2020-08-14 PROCEDURE — 6370000000 HC RX 637 (ALT 250 FOR IP): Performed by: INTERNAL MEDICINE

## 2020-08-14 PROCEDURE — 2580000003 HC RX 258: Performed by: INTERNAL MEDICINE

## 2020-08-14 PROCEDURE — 93306 TTE W/DOPPLER COMPLETE: CPT

## 2020-08-14 PROCEDURE — APPSS60 APP SPLIT SHARED TIME 46-60 MINUTES: Performed by: CLINICAL NURSE SPECIALIST

## 2020-08-14 PROCEDURE — 85025 COMPLETE CBC W/AUTO DIFF WBC: CPT

## 2020-08-14 PROCEDURE — 84484 ASSAY OF TROPONIN QUANT: CPT

## 2020-08-14 PROCEDURE — 99222 1ST HOSP IP/OBS MODERATE 55: CPT | Performed by: INTERNAL MEDICINE

## 2020-08-14 PROCEDURE — 83735 ASSAY OF MAGNESIUM: CPT

## 2020-08-14 PROCEDURE — 6360000002 HC RX W HCPCS: Performed by: INTERNAL MEDICINE

## 2020-08-14 PROCEDURE — 85378 FIBRIN DEGRADE SEMIQUANT: CPT

## 2020-08-14 PROCEDURE — 82962 GLUCOSE BLOOD TEST: CPT

## 2020-08-14 PROCEDURE — 80048 BASIC METABOLIC PNL TOTAL CA: CPT

## 2020-08-14 PROCEDURE — 2060000000 HC ICU INTERMEDIATE R&B

## 2020-08-14 PROCEDURE — 84443 ASSAY THYROID STIM HORMONE: CPT

## 2020-08-14 PROCEDURE — 97535 SELF CARE MNGMENT TRAINING: CPT

## 2020-08-14 PROCEDURE — 97161 PT EVAL LOW COMPLEX 20 MIN: CPT

## 2020-08-14 PROCEDURE — 80061 LIPID PANEL: CPT

## 2020-08-14 PROCEDURE — 97165 OT EVAL LOW COMPLEX 30 MIN: CPT

## 2020-08-14 PROCEDURE — 36415 COLL VENOUS BLD VENIPUNCTURE: CPT

## 2020-08-14 PROCEDURE — 97530 THERAPEUTIC ACTIVITIES: CPT

## 2020-08-14 RX ORDER — FINASTERIDE 5 MG/1
5 TABLET, FILM COATED ORAL DAILY
Status: DISCONTINUED | OUTPATIENT
Start: 2020-08-14 | End: 2020-08-15 | Stop reason: HOSPADM

## 2020-08-14 RX ORDER — TAMSULOSIN HYDROCHLORIDE 0.4 MG/1
0.4 CAPSULE ORAL DAILY
Status: DISCONTINUED | OUTPATIENT
Start: 2020-08-14 | End: 2020-08-15 | Stop reason: HOSPADM

## 2020-08-14 RX ORDER — FUROSEMIDE 10 MG/ML
20 INJECTION INTRAMUSCULAR; INTRAVENOUS ONCE
Status: COMPLETED | OUTPATIENT
Start: 2020-08-14 | End: 2020-08-14

## 2020-08-14 RX ADMIN — APIXABAN 5 MG: 5 TABLET, FILM COATED ORAL at 21:05

## 2020-08-14 RX ADMIN — INSULIN LISPRO 2 UNITS: 100 INJECTION, SOLUTION INTRAVENOUS; SUBCUTANEOUS at 21:06

## 2020-08-14 RX ADMIN — Medication 400 UNITS: at 09:14

## 2020-08-14 RX ADMIN — INSULIN LISPRO 4 UNITS: 100 INJECTION, SOLUTION INTRAVENOUS; SUBCUTANEOUS at 16:19

## 2020-08-14 RX ADMIN — TAMSULOSIN HYDROCHLORIDE 0.4 MG: 0.4 CAPSULE ORAL at 09:15

## 2020-08-14 RX ADMIN — FINASTERIDE 5 MG: 5 TABLET, FILM COATED ORAL at 10:47

## 2020-08-14 RX ADMIN — Medication 10 ML: at 09:15

## 2020-08-14 RX ADMIN — CHOLECALCIFEROL TAB 50 MCG (2000 UNIT) 5000 UNITS: 50 TAB at 09:14

## 2020-08-14 RX ADMIN — OXYBUTYNIN CHLORIDE 5 MG: 5 TABLET ORAL at 09:15

## 2020-08-14 RX ADMIN — ACETAMINOPHEN 650 MG: 325 TABLET ORAL at 16:33

## 2020-08-14 RX ADMIN — ASPIRIN 81 MG: 81 TABLET, COATED ORAL at 09:15

## 2020-08-14 RX ADMIN — APIXABAN 5 MG: 5 TABLET, FILM COATED ORAL at 09:15

## 2020-08-14 RX ADMIN — Medication 10 ML: at 21:05

## 2020-08-14 RX ADMIN — FUROSEMIDE 20 MG: 10 INJECTION, SOLUTION INTRAMUSCULAR; INTRAVENOUS at 09:15

## 2020-08-14 RX ADMIN — INSULIN LISPRO 2 UNITS: 100 INJECTION, SOLUTION INTRAVENOUS; SUBCUTANEOUS at 06:14

## 2020-08-14 RX ADMIN — ATORVASTATIN CALCIUM 10 MG: 10 TABLET, FILM COATED ORAL at 21:05

## 2020-08-14 RX ADMIN — INSULIN GLARGINE 12 UNITS: 100 INJECTION, SOLUTION SUBCUTANEOUS at 21:06

## 2020-08-14 RX ADMIN — ALOGLIPTIN 12.5 MG: 12.5 TABLET, FILM COATED ORAL at 09:14

## 2020-08-14 RX ADMIN — METOPROLOL SUCCINATE 50 MG: 50 TABLET, EXTENDED RELEASE ORAL at 09:14

## 2020-08-14 RX ADMIN — ESCITALOPRAM OXALATE 10 MG: 10 TABLET ORAL at 09:15

## 2020-08-14 ASSESSMENT — PAIN SCALES - GENERAL
PAINLEVEL_OUTOF10: 2
PAINLEVEL_OUTOF10: 5
PAINLEVEL_OUTOF10: 0
PAINLEVEL_OUTOF10: 0

## 2020-08-14 NOTE — CARE COORDINATION
CM NOTE: Per QFR--- admitted with CHF. Given IV lasix. Plan is echo & cardiology eval today. Currently using O2 2L which he doesn't have at home. Therapy evals pending.

## 2020-08-14 NOTE — PROGRESS NOTES
Physical Therapy    Fac[facility]  Initial Assessment    NAME: Aline Johnson  : 1927  MRN: [id number]    Date of Service: 2020      Attending Provider:  Yelena Hough MD    Evaluating PT:  Kylah Nunez. Pato Mcfarland, P.T. Room #:  0014/2132-B  Diagnosis:  CHF  Precautions:  Falls, bed/chair alarm, [place]    SUBJECTIVE:    Pt lives with wife in a 1 story home with a ramp to enter. Pt has paid caregiver in am and late evening and family member comes to assist he and his wife everyday. Pt ambulated with ww short distances or uses WC or transport chair for mobility PTA. OBJECTIVE:   Initial Evaluation  Date: 20 Treatment Short Term/ Long Term   Goals   Was pt agreeable to Eval/treatment? yes     Does pt have pain? No c/o pain     Bed Mobility  Rolling: SBA  Supine to sit: MIN A  Sit to supine: MOD A  Scooting: MIN A  Independent    Transfers Sit to stand: MAX A  Stand to sit: MOD A  Stand pivot: NA  MIN A   Ambulation   3 side steps to R toward HOB with ww MOD A  15 feet with ww MIN A   Stair negotiation: ascended and descended NA  NA   AM-PAC 6 Clicks        BLE ROM is WFL. BLE strength is grossly 3-/5 to 4/5. Sensation:  Pt c/o numbness B hands  Edema:  None noted  Balance: sitting is SBA and standing with ww is MOD A/MAX A due to posterior lean  Endurance: fair    Patient education  Pt and daughter were educated on transfer technique    Patient response to education:   Pt verbalized understanding Pt demonstrated skill Pt requires further education in this area   yes Yes with VCs and assist yes     ASSESSMENT:    Comments:  Pt has decreased strength and endurance which appears to be his baseline per daughter. She reports they assist pt at home with bed mobility, transfers, and the limited attempts at gait he makes. Pt sat EOB x 5 min working on getting closer to EOB and posture.   He stood with ww and had significant posterior lean that without assist he would have fallen backwards. He stood x 4 min with ww working on standing balance and posture to improve his balance and lessen his posterior lean. With practice pt did require less assist to maintain his balance. Treatment:  Patient practiced and was instructed in the following treatment:     Bed mobility, transfers, sitting and standing balance and side stepping to improve functional strength, balance, and endurance. Pt was left supine in bed per his request with call light left by patient and with daughter present. Chair/bed alarm: bed alarm was re-activated. Pt's/ family goals   1. To go home. Patient and or family understand(s) diagnosis, prognosis, and plan of care. PLAN:    PT care will be provided in accordance with the objectives noted above. Exercises and functional mobility practice will be used as well as appropriate assistive devices or modalities to obtain goals. Patient and family education will also be administered as needed. Frequency of treatments: 2-5x/week x 1-2 weeks. Time in  13:45  Time out  14:10    Total Treatment Time 10 minutes     Evaluation Time includes thorough review of current medical information, gathering information on past medical history/social history and prior level of function, completion of standardized testing/informal observation of tasks, assessment of data and education on plan of care and goals. CPT codes:  [x] Low Complexity PT evaluation 61635  [] Moderate Complexity PT evaluation 11986  [] High Complexity PT evaluation 45818  [] PT Re-evaluation 24503  [] Gait training 50575 ** minutes  [] Manual therapy 92240 ** minutes  [x] Therapeutic activities 41790 10 minutes  [] Therapeutic exercises 99030 ** minutes  [] Neuromuscular reeducation 77488 ** minutes     Jean Carlos Quiñones., P.T.   License Number: [id number]

## 2020-08-14 NOTE — PROGRESS NOTES
Pt weaned from 2Ls to 1L of O2 but refuses for me to remove O2. He states with O2 on he does not feel so short of breath.  I will leave 1L of O2 on for now per pt request. Pt SPO2 is 97% on 1L of O2
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
## 2020-08-14 NOTE — CARE COORDINATION
Social work / Discharge Planning:       Social work consult noted regarding \"discharge planning\"       Social work met with patient's daughter Valdez Castrejon at bedside for initial assessment. Patient is a readmission from [facility]. He lives with his wife. They have caregivers in the home through the [employer] program as well as family support. This leaves them with 24 hour care. Patient has a walker and wc. He had a past stay at [facility] and has used home care but the daughter cannot recall which one. Patient has the necessary diabetic supplies in the home. He is currently using 02 which he does not have at home. If home 02 is needed for discharge, they do not have a preference. AM PAC 12/24. Discharge plan is home with family. Daughter denied the need for home care and states family will transport the patient home for discharge. Social work will follow.   Electronically signed by JENNIFER Palma on 8/14/2020 at 2:56 PM

## 2020-08-14 NOTE — PROGRESS NOTES
Patient admitted with suspicion for new onset CHF, Cardio consulted. Lisinopril held because of Creatinine increase. Pro-BNP elevated at 1845, Last echo was in 2017, ECHO ordered. Will await cardio consult and echo until I see patient.   Electronically signed by Christin Funez RN on 8/14/2020 at 8:53 AM

## 2020-08-14 NOTE — H&P
History & Physical        Reason for admission:   Dyspnea on minimal exertion, 10 lb weight gain    History Obtained From:  ER, patient. HISTORY OF PRESENT ILLNESS:    The patient is a 80 y.o. male who presents with dyspnea on minimal exertion (walking across a room). Ambulates with walker at home. 10 lb weight gain. No cough, fever, chills, orthopnea, PND, palpitations. Some lower extremity edema. Was at [facility] 's main under hospitalist recently with similar complaint. Workup found pancreatic cystic lesions. Outpatient EUS planned though at his age would discuss this further with his family. Not sure any diagnosis found for dyspnea. ProBNP doubled from that to this visit. Suspicion is that this represents new onset CHF. Last cardiac testing via echo and stress test was 7/17. Echos have shown diastolic dysfunction in past.   This am he is comfortable in bed. Was given lasix 20 mg IV in ER. I started him on low dose lisinopril 5 mg daily. Cr is up from 1.0-1.2 this am. WIll need to follow this closely. 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 Past Medical History:        Diagnosis Date    Compression fracture     L1    Depression     Diabetes ([address]     Diabetes mellitus ([address]     H/O blood clots     Heart murmur     Hyperlipidemia 5/8/2012    Hypertension 5/8/2012    Pneumothorax     ECF staff unable to confirm    Possible atherosclerotic coronary artery disease.  5/8/2012    Dr. Erika Smith Stroke (cerebrum) [facility])     Unspecified cerebral artery occlusion with cerebral infarction     slight right sided weakness       Past Surgical History:        Procedure Laterality Date    JOINT REPLACEMENT      right hip 4 times    KNEE SURGERY      both knees    OTHER SURGICAL HISTORY  11/5/2015    kyphoplasty-L1    TONSILLECTOMY         Medications Prior to Admission:    Medications Prior to Admission: dutasteride (AVODART) 0.5 MG capsule, Take 0.5 mg by mouth daily  tamsulosin (FLOMAX) 0.4 MG capsule, Take 0.4 mg by mouth daily  furosemide (LASIX) 20 MG tablet, Take 1 tablet by mouth daily  apixaban (ELIQUIS) 5 MG TABS tablet, Take 1 tablet by mouth 2 times daily  glimepiride (AMARYL) 4 MG tablet, Take 2 tablets by mouth daily (with breakfast)  insulin detemir (LEVEMIR FLEXTOUCH) 100 UNIT/ML injection pen, Inject 12 Units into the skin every evening  escitalopram (LEXAPRO) 10 MG tablet, Take 10 mg by mouth daily  Cholecalciferol (VITAMIN D3) 5000 units TABS, Take 5,000 Units by mouth  naproxen (NAPROSYN) 500 MG tablet, Take 1 tablet by mouth 2 times daily (with meals)  oxybutynin (DITROPAN) 5 MG tablet, Take 5 mg by mouth every morning   atorvastatin (LIPITOR) 10 MG tablet, Take 10 mg by mouth nightly   ketoconazole (NIZORAL) 2 % cream, Apply topically daily Apply topically daily. Multiple Vitamins-Minerals (MULTIVITAMIN ADULT PO), Take 1 tablet by mouth every other day   vitamin E 400 UNIT capsule, Take 400 Units by mouth every morning   aspirin 81 MG tablet, Take 81 mg by mouth every morning   linagliptin (TRADJENTA) 5 MG tablet, Take 5 mg by mouth daily  metoprolol (TOPROL-XL) 50 MG XL tablet, Take 50 mg by mouth every morning     Allergies:  Bupivacaine; Niaspan [niacin er]; and Vicodin [hydrocodone-acetaminophen]    Social History:   TOBACCO:   reports that he quit smoking about 47 years ago. His smoking use included cigarettes. He has never used smokeless tobacco.  ETOH:   reports no history of alcohol use.       Family History:       Problem Relation Age of Onset    Heart Disease Mother        REVIEW OF SYSTEMS:  CONSTITUTIONAL:  Neg   Recent weight changes,fatigue,fever,chills or night sweats  EYES:  Neg  blurriness,tearing,itching or acute change in vision  NOSE:  Neg  rhinorrhea,sneezing,itching,allergy or epistaxis  MOUTH/THROAT:  Neg  bleeding gums,hoarseness or sore throat. RESPIRATORY:   Neg Wheeze,cough,sputum,hemoptysis or bronochitis. Pos: Dyspnea  CARDIOVASCULAR   Neg : chest pain,palpitations,orthopnea,paroxysmal nocturnal dyspnea. Pos: weight gain,edema  GASTROINTESTINAL:  Neg   Appetite changes,nausea,vomiting,or diarrhea,indigestion,dysphagia,change in bowel movements, or abdominal pain. GENITOURINARY:  Neg  Urinary frequency,hesitancy,urgency,polyuria,dysuria,hematuria,or incontinence. HEMATOLOGIC/LYMPHATIC:  Neg  Anemia,bleeding tendency  MUSCULOSKELETAL:  Neg   New myalgias,bone pain,joint pain,swelling or stiffness and has had no change in gait. NEUROLOGICAL:  Neg  Loss of Consciousness,memeory loss,forgetfulness,periods of confusion,decline in intellect,nervousness,insomina,aphasia or dysarthria. SKIN :  Neg  skin or hair changes,and has no itching,rashes,sores. PHYSICAL EXAM:  /70   Pulse 64   Temp 97.7 °F (36.5 °C) (Oral)   Resp 18   Ht 5' 7\" (1.702 m)   Wt 173 lb 15.1 oz (78.9 kg)   SpO2 98%   BMI 27.24 kg/m²   General appearance: alert, appears stated age, cooperative and no distress  Head: Normocephalic, without obvious abnormality, atraumatic  Eyes: conjunctivae/corneas clear. PERRL, EOM's intact.    Ears: normal external ear canals both ears  Neck: no adenopathy, no carotid bruit, no JVD, supple, symmetrical, trachea midline and thyroid not enlarged, no tenderness/mass/nodules  Lungs: Clear to A and P  Heart: regular rate and rhythm, S1, S2 normal, systolic murmur, regular rate and rhythm ,no precordial heave  Abdomen:soft, non-tender; non-distended normal bowel sounds no masses, no organomegaly  Extremities:Pedal edema Trace  Homans sign is negative, no sign of DVT  Skin: Skin color, texture, turgor normal. No rashes or lesions  Neurologic:Mental status: Alert, oriented, thought content appropriate  Cranial nerves:II-XII Grossly intact  Sensory: normal  Motor:grossly normal    DATA:  Recent Labs 08/13/20 1931 08/14/20  0505   WBC 9.3 8.2   HGB 11.6* 11.3*   HCT 34.7* 34.7*   MCV 92.0 93.5    165     Recent Labs     08/13/20 1931 08/14/20  0505    137   K 4.4 4.2    105   CO2 25 24   BUN 23 26*   CREATININE 1.0 1.2   GLUCOSE 269* 166*     Recent Labs     08/13/20 1931   AST 21   ALT 13   BILITOT 0.6   ALKPHOS 60     Recent Labs     08/13/20 1931 08/14/20  0015   TROPONINI 0.02 <0.01     Lab Results   Component Value Date    INR 2.2 08/13/2020    INR 1.8 07/30/2020    INR 1.4 03/14/2019    PROTIME 26.2 (H) 08/13/2020    PROTIME 20.3 (H) 07/30/2020    PROTIME 16.0 (H) 03/14/2019        CBC with Differential:    Lab Results   Component Value Date    WBC 8.2 08/14/2020    RBC 3.71 08/14/2020    HGB 11.3 08/14/2020    HCT 34.7 08/14/2020     08/14/2020    MCV 93.5 08/14/2020    MCH 30.5 08/14/2020    MCHC 32.6 08/14/2020    RDW 13.9 08/14/2020    SEGSPCT 74 05/11/2012    LYMPHOPCT 16.7 08/14/2020    MONOPCT 13.9 08/14/2020    BASOPCT 0.7 08/14/2020    MONOSABS 1.14 08/14/2020    LYMPHSABS 1.37 08/14/2020    EOSABS 0.13 08/14/2020    BASOSABS 0.06 08/14/2020     CMP:    Lab Results   Component Value Date     08/14/2020    K 4.2 08/14/2020    K 4.1 07/31/2020     08/14/2020    CO2 24 08/14/2020    BUN 26 08/14/2020    CREATININE 1.2 08/14/2020    GFRAA >60 08/14/2020    LABGLOM 57 08/14/2020    GLUCOSE 166 08/14/2020    GLUCOSE 117 05/11/2012    PROT 5.7 08/13/2020    LABALBU 3.0 08/13/2020    LABALBU 3.5 05/11/2012    CALCIUM 8.5 08/14/2020    BILITOT 0.6 08/13/2020    ALKPHOS 60 08/13/2020    AST 21 08/13/2020    ALT 13 08/13/2020     Magnesium:    Lab Results   Component Value Date    MG 1.8 08/14/2020     Phosphorus:  No results found for: PHOS  Troponin:    Lab Results   Component Value Date    TROPONINI <0.01 08/14/2020     U/A:    Lab Results   Component Value Date    COLORU Yellow 08/13/2020    PHUR 6.0 08/13/2020    WBCUA 1-3 08/13/2020    RBCUA 2-5 08/13/2020 BACTERIA NONE SEEN 08/13/2020    CLARITYU Clear 08/13/2020    SPECGRAV 1.020 08/13/2020    LEUKOCYTESUR Negative 08/13/2020    UROBILINOGEN 1.0 08/13/2020    BILIRUBINUR Negative 08/13/2020    BLOODU MODERATE 08/13/2020    GLUCOSEU 100 08/13/2020     ABG:    Lab Results   Component Value Date    P6RITUEJ 94.3 05/10/2012          RADIOLOGY:   XR CHEST (2 VW)   Final Result   Minimal right basilar airspace disease, likely atelectasis, without   significant pleural effusion. Cannot completely exclude aspiration   and/or infection in the right clinical setting. ASSESSMENT   Dyspnea- CHF-diastolic most likely, r/o signicant valvular disease  Pancreatic cystic lesions-evaluation by EUS being considered. 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            Patient Active Problem List   Diagnosis Code    HTN (hypertension), benign I10    Mixed hyperlipidemia E78.2    Diabetes mellitus type 2, uncontrolled ([facility] E11.65    Lower leg DVT (deep venous thromboembolism), acute, right (HCC) I82.4Z1    History of CVA (cerebrovascular accident) Z86.73    Dysphagia R13.10    Pancreatic mass K86.89    Congestive heart failure ([facility] I50.9     PLAN:  Will give another 20 mg lasix IV this am.  Hold lisinopril for now given bump in Cr.  2d Echo  Cardio consult. PT/OT consult  Follow labs.       Disposition:  Home, possibly with home health      Electronically signed by Joycelyn Valdovinos MD on 8/14/2020 at 7:56 AM

## 2020-08-14 NOTE — PROGRESS NOTES
Occupational Therapy  OCCUPATIONAL THERAPY INITIAL EVALUATION      Date:2020  Patient Name: Jeannie Rubin  MRN: [id number]  : 1927  Room: [address] [place] [address]A    Referring Provider: Amina Trinidad MD    Evaluating OT: Enoc Waterman OTR/L [id number]    AM-PAC Daily Activity Raw Score:     Recommended Adaptive Equipment: TBD    Diagnosis: CHF. Pt presents to ED from home with SOB. Pertinent Medical History: CVA, HTN, DM   Precautions:  falls     Home Living: Pt lives with wife in a single story home. Bathroom setup: walk in shower with seat and grab bars, has BSC     Prior Level of Function: pt reports having wife, family or pd caregiver assist with all ADLs/IADLs; completed functional mobility with [employer] short in home distances with family assist. Pt reports use of urinal with assist primarily for toileting. Driving: No    Pain Level: no reported or observable pain    Cognition: A&O: 3/4. Pt is oriented to self, hospital and temporal concepts. Confusion regarding situation. Encouragement required to actively participate in all functional tasks. Pt frequently reports \"well they help me with this at home\" with request to actively participate.    Problem solving:  fair   Judgement/safety:  fair     Functional Assessment:   Initial Eval Status  Date: 20 Treatment session:  Short Term Goals  Treatment frequency: 2-5x/wk PRN x1-3 wks     Feeding Set up     Grooming Min A  Set up   UB Dressing Mod A  Management of hospital gown  Set up   LB Dressing Max A  Donning B socks  Min A    Bathing Max A  Min A   Toileting Max A  Use of urinal to void  Management of brief  Min A   Bed Mobility  Supine to sit: Mod A     Functional Transfers STS: Mod A  Strong posterior lean initially increased time to achieve balance  SBA   Functional Mobility Min A with [employer]  Short in room distance  Occasional unsteadiness with LOB Min A for recovery  SBA during ADLs   Balance Sitting: fair    Standing: fair minus at [employer]     Activity Tolerance Poor plus  O2 on RA restin%  Post activity: 93% min recovery time to 97%  Mild SOB with activity  standing piyush x5-6 min with fair plus balance during self care tasks           Treatment: Patient educated on techniques for completion of ADL, safe functional transfers and functional mobility. Patient required cues for follow through with proper hand/foot placement, pacing, safety, attention, sequencing and technique in bed mobility, functional transfers, functional mobility, UB dressing, toileting and LB dressing in preparation for maximum independence in all self care tasks. Hand Dominance: Right []  Left []   Strength ROM Additional Info:    RUE  Proximally: 3-/5  Distally: 3+/5 WFL elbow to digits. R shoulder approx 60 degrees  Arthritic changes in R hand fair  and FMC/dexterity noted during ADL tasks     LUE Proximally: 3-/5  Distally: 3+/5 WFL elbow to digits.  L shoulder approx 75 degrees Arthritic changes in L hand fair  and FMC/dexterity noted during ADL tasks         Hearing: [place]  Vision: WFL   Sensation:  No c/o numbness or tingling   Tone: WFL                             Long Term Goal (1-3 wks): Pt will maximize functional performance in all self care tasks/functional transfers with good follow through of all trained techniques for safe transition to next level of care    Eval Complexity: Low    Assessment of current deficits   Functional mobility [x]  ADLs [x] Strength [x]  Cognition []  Functional transfers  [x] IADLs [x] Safety Awareness [x]  Endurance [x]  Fine Motor Coordination [] Balance [x] Vision/perception [] Sensation []   Gross Motor Coordination [] ROM [] Delirium []                  Motor Control []    Plan of Care:   ADL retraining [x]   Equipment needs [x]   Neuromuscular re-education [x] Energy Conservation Techniques [x]  Functional Transfer training [x] Patient and/or Family Education [x]  Functional Mobility training [x]  Environmental Modifications [x]  Cognitive re-training []   Compensatory techniques for ADLs [x]  Splinting Needs []   Positioning to improve overall function [x]   Therapeutic Activity [x]  Therapeutic Exercise  [x]  Visual/Perceptual: []    Delirium prevention/treatment  []   Other:  []    Rehab Potential: Good for established goals     Patient / Family Goal: To get home. Patient and/or family were instructed on functional diagnosis, prognosis/goals and OT plan of care. Pt verbalized fair understanding. Upon arrival, patient supine in bed. At end of session, patient supine in bed per pt request with call light and phone within reach, all lines and tubes intact. Pt would benefit from continued skilled OT to increase safety and independence with completion of ADL/IADL tasks for functional independence and quality of life.  Bed/chair alarm: ON    Low Evaluation + 10 timed treatment minutes  Treatment Time In:944   Treatment Time Out: 954   Treatment Charges: Mins Units    ADL/Home Mgt [id number] 1    Thera Activities 84846 3     Ther Ex 54626      Manual Therapy 33788      Neuro Re-ed [id number]      Orthotic manage/training  93752      Non Billable Time      Total Timed Treatment 10 1          Evaluation time includes thorough review of current medical information, gathering information on past medical history/social history and prior level of function, completion of standardized testing/informal observation of tasks, assessment of data, and development of POC/Goals    Carmen Harris OTR/L  [id number]

## 2020-08-15 VITALS
OXYGEN SATURATION: 96 % | HEART RATE: 61 BPM | TEMPERATURE: 96.8 F | WEIGHT: 173.94 LBS | HEIGHT: 67 IN | SYSTOLIC BLOOD PRESSURE: 127 MMHG | DIASTOLIC BLOOD PRESSURE: 60 MMHG | BODY MASS INDEX: 27.3 KG/M2 | RESPIRATION RATE: 16 BRPM

## 2020-08-15 LAB
ANION GAP SERPL CALCULATED.3IONS-SCNC: 8 MMOL/L (ref 7–16)
BUN BLDV-MCNC: 38 MG/DL (ref 8–23)
CALCIUM SERPL-MCNC: 8.8 MG/DL (ref 8.6–10.2)
CHLORIDE BLD-SCNC: 105 MMOL/L (ref 98–107)
CO2: 26 MMOL/L (ref 22–29)
CREAT SERPL-MCNC: 1.3 MG/DL (ref 0.7–1.2)
GFR AFRICAN AMERICAN: >60
GFR NON-AFRICAN AMERICAN: 52 ML/MIN/1.73
GLUCOSE BLD-MCNC: 133 MG/DL (ref 74–99)
MAGNESIUM: 1.8 MG/DL (ref 1.6–2.6)
METER GLUCOSE: 147 MG/DL (ref 74–99)
METER GLUCOSE: 156 MG/DL (ref 74–99)
POTASSIUM SERPL-SCNC: 3.8 MMOL/L (ref 3.5–5)
SODIUM BLD-SCNC: 139 MMOL/L (ref 132–146)

## 2020-08-15 PROCEDURE — 83735 ASSAY OF MAGNESIUM: CPT

## 2020-08-15 PROCEDURE — 6370000000 HC RX 637 (ALT 250 FOR IP): Performed by: INTERNAL MEDICINE

## 2020-08-15 PROCEDURE — 2580000003 HC RX 258: Performed by: INTERNAL MEDICINE

## 2020-08-15 PROCEDURE — 82962 GLUCOSE BLOOD TEST: CPT

## 2020-08-15 PROCEDURE — 99232 SBSQ HOSP IP/OBS MODERATE 35: CPT | Performed by: INTERNAL MEDICINE

## 2020-08-15 PROCEDURE — 36415 COLL VENOUS BLD VENIPUNCTURE: CPT

## 2020-08-15 PROCEDURE — 80048 BASIC METABOLIC PNL TOTAL CA: CPT

## 2020-08-15 RX ORDER — FUROSEMIDE 20 MG/1
20 TABLET ORAL SEE ADMIN INSTRUCTIONS
Qty: 60 TABLET | Refills: 3 | Status: SHIPPED | OUTPATIENT
Start: 2020-08-17

## 2020-08-15 RX ORDER — GLIMEPIRIDE 4 MG/1
4 TABLET ORAL
Status: DISCONTINUED | OUTPATIENT
Start: 2020-08-15 | End: 2020-08-15 | Stop reason: HOSPADM

## 2020-08-15 RX ORDER — FUROSEMIDE 20 MG/1
20 TABLET ORAL DAILY
Status: DISCONTINUED | OUTPATIENT
Start: 2020-08-15 | End: 2020-08-15 | Stop reason: HOSPADM

## 2020-08-15 RX ADMIN — INSULIN LISPRO 2 UNITS: 100 INJECTION, SOLUTION INTRAVENOUS; SUBCUTANEOUS at 06:24

## 2020-08-15 RX ADMIN — ALOGLIPTIN 12.5 MG: 12.5 TABLET, FILM COATED ORAL at 08:59

## 2020-08-15 RX ADMIN — FUROSEMIDE 20 MG: 20 TABLET ORAL at 11:23

## 2020-08-15 RX ADMIN — ASPIRIN 81 MG: 81 TABLET, COATED ORAL at 09:01

## 2020-08-15 RX ADMIN — ESCITALOPRAM OXALATE 10 MG: 10 TABLET ORAL at 09:00

## 2020-08-15 RX ADMIN — APIXABAN 5 MG: 5 TABLET, FILM COATED ORAL at 08:59

## 2020-08-15 RX ADMIN — CHOLECALCIFEROL TAB 50 MCG (2000 UNIT) 5000 UNITS: 50 TAB at 09:00

## 2020-08-15 RX ADMIN — OXYBUTYNIN CHLORIDE 5 MG: 5 TABLET ORAL at 09:00

## 2020-08-15 RX ADMIN — ACETAMINOPHEN 650 MG: 325 TABLET ORAL at 09:12

## 2020-08-15 RX ADMIN — GLIMEPIRIDE 4 MG: 4 TABLET ORAL at 11:23

## 2020-08-15 RX ADMIN — INSULIN LISPRO 2 UNITS: 100 INJECTION, SOLUTION INTRAVENOUS; SUBCUTANEOUS at 11:27

## 2020-08-15 RX ADMIN — FINASTERIDE 5 MG: 5 TABLET, FILM COATED ORAL at 08:59

## 2020-08-15 RX ADMIN — METOPROLOL SUCCINATE 50 MG: 50 TABLET, EXTENDED RELEASE ORAL at 09:01

## 2020-08-15 RX ADMIN — TAMSULOSIN HYDROCHLORIDE 0.4 MG: 0.4 CAPSULE ORAL at 09:00

## 2020-08-15 RX ADMIN — Medication 10 ML: at 11:19

## 2020-08-15 RX ADMIN — Medication 400 UNITS: at 08:59

## 2020-08-15 ASSESSMENT — PAIN SCALES - GENERAL
PAINLEVEL_OUTOF10: 0
PAINLEVEL_OUTOF10: 0

## 2020-08-15 NOTE — PROGRESS NOTES
Dr. Felecia Hua notified of need for D/C orders with med rec updated. .. Dr. Celina Alvarado said OK for discharge.

## 2020-08-15 NOTE — DISCHARGE SUMMARY
mm.   Mild-to-moderate mitral regurgitation with centrally directed jet.   Mild aortic regurgitation is noted.   There is mild-to-moderate aortic stenosis with valve area of 1.2 sq cm.  RVSP is 29 mmHg. Normal estimated PA systolic pressure.   No evidence for hemodynamically significant pericardial effusion.   Compared to prior echo of 7/7/2017,changes noted. Now, has mild to   moderate aortic stenosis. Pt felt better with above treatment. Home oxygen testing done on day of d/c:  Cardiology (Dr. Mal Fajardo) recommended skip Sun dose lasix, then resume. Check BMP Tuesday. 1 month visit with Dr. Yovana Paul. Signed              Show:Clear all  [x]Manual[x]Template[]Copied    Added by:  Haritha Talbert RN    [][place]asver for details  Pulse ox was 96______% on room air at rest.  Ambulated patient on room air. Oxygen saturation was _86_____% on room air while ambulating. Oxygen applied. Recovery pulse ox was _92_____% on ___4____ liters of oxygen while ambulating. Oxygen set up through [facility] for delivery today to patients home. Physical Exam:  /60   Pulse 61   Temp 96.8 °F (36 °C) (Infrared)   Resp 16   Ht 5' 7\" (1.702 m)   Wt 173 lb 15.1 oz (78.9 kg)   SpO2 96%   BMI 27.24 kg/m²   General appearance: alert, appears stated age and cooperative  Head: Normocephalic, without obvious abnormality, atraumatic  Eyes: conjunctivae/corneas clear. PERRL, EOM's intact. Ears: External ears -normal  Nose: No drainage or sinus tenderness.   Throat: lips, mucosa, and tongue normal; teeth and gums normal  Neck: no adenopathy, no carotid bruit, no JVD, supple, symmetrical, trachea midline and thyroid not enlarged, symmetric, no tenderness/mass/nodules  Lungs: clear to auscultation bilaterally  Heart: regular rate and rhythm, S1, S2 normal, systolic murmur,   Abdomen: soft, non-tender; bowel sounds normal; no masses,  no organomegaly  Extremities: extremities normal, atraumatic, no cyanosis, Trace edena  Neurologic: Grossly normal    XR CHEST (2 VW)   Final Result   Minimal right basilar airspace disease, likely atelectasis, without   significant pleural effusion. Cannot completely exclude aspiration   and/or infection in the right clinical setting. Consults: cardiology  Treatments: cardiac meds: furosemide  Disposition: home  Discharged Condition: Stable  Follow Up: Primary Care Physician in one week  Discharge Medications:   Joan Simms   Home Medication Instructions DQP:[id number]    Printed on:08/15/20 1411   Medication Information                      apixaban (ELIQUIS) 5 MG TABS tablet  Take 1 tablet by mouth 2 times daily             aspirin 81 MG tablet  Take 81 mg by mouth every morning              atorvastatin (LIPITOR) 10 MG tablet  Take 10 mg by mouth nightly              Cholecalciferol (VITAMIN D3) 5000 units TABS  Take 5,000 Units by mouth             dutasteride (AVODART) 0.5 MG capsule  Take 0.5 mg by mouth daily             escitalopram (LEXAPRO) 10 MG tablet  Take 10 mg by mouth daily             furosemide (LASIX) 20 MG tablet  Take 1 tablet by mouth See Admin Instructions             glimepiride (AMARYL) 4 MG tablet  Take 2 tablets by mouth daily (with breakfast)             insulin detemir (LEVEMIR FLEXTOUCH) 100 UNIT/ML injection pen  Inject 12 Units into the skin every evening             ketoconazole (NIZORAL) 2 % cream  Apply topically daily Apply topically daily.              linagliptin (TRADJENTA) 5 MG tablet  Take 5 mg by mouth daily             metoprolol (TOPROL-XL) 50 MG XL tablet  Take 50 mg by mouth every morning              Multiple Vitamins-Minerals (MULTIVITAMIN ADULT PO)  Take 1 tablet by mouth every other day              oxybutynin (DITROPAN) 5 MG tablet  Take 5 mg by mouth every morning              tamsulosin (FLOMAX) 0.4 MG capsule  Take 0.4 mg by mouth daily             vitamin E 400 UNIT capsule  Take 400 Units by mouth every morning Activity: activity as tolerated  Diet: diabetic dietWound Care: none needed  Time Spent on discharge is more than 30 minutes discussing plan of care and discharge medications. Acute diastolic CHF-echo shows stage 1 DD, Mild-mod AS, MR. Mild to mod MR, Mild AR   Pancreatic cystic lesions-evaluation by EUS being considered. 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 Active Problems:    Congestive heart failure (HCC)    Dyspnea on exertion  Resolved Problems:    * No resolved hospital problems.  *      Signed:  Electronically signed by Nick Caro MD on 8/15/2020 at 2:11 PM

## 2020-08-15 NOTE — PROGRESS NOTES
[facility] Quality Flow/Interdisciplinary Rounds Progress Note        Quality Flow Rounds held on August 15, 2020    Disciplines Attending:  Bedside Nurse and Nursing Unit Leadership    [place] was admitted on 8/13/2020  6:31 PM    Anticipated Discharge Date:  Expected Discharge Date: 08/14/20(estimate)    Disposition:    Mitul Score:  Mitul Scale Score: 19    Readmission Risk              Risk of Unplanned Readmission:        15           Discussed patient goal for the day, patient clinical progression, and barriers to discharge. The following Goal(s) of the Day/Commitment(s) have been identified:  Check Cardiology plan, diuretics as tolerated, wean oxygen.       Rosa Montgomery  August 15, 2020

## 2020-08-15 NOTE — PROGRESS NOTES
Per family -[facility] is making arrangements for oxygen delivery today-message sent to [facility] for discharge plan, awaiting response.   Electronically signed by Win Barron RN on 8/15/2020 at 12:49 PM

## 2020-08-15 NOTE — PROGRESS NOTES
[address] [facility] delivered oxygen concentrator to patient's house and brought portable to patient's room and son was instructed on use. Second rep brought oxygen after patient discharged, however was unaware that patient had already received it-called patient's home, spoke with wife Primitivo Osorio and son-both verified that oxygen was delivered.   Electronically signed by Josse Chilel RN on 8/15/2020 at 3:32 PM

## 2020-08-15 NOTE — PROGRESS NOTES
Admit Date: 8/13/2020    Subjective:     Patient seen. Son Jess Tomlin at bedside. Quiet night, feels better. Family is \"hoping\" he qualifies for 02 at d/c. Lasix 20 mg IV had a rather profound impact on his renal fxn. ACEI initiated/then held. Scheduled Meds:   finasteride  5 mg Oral Daily    tamsulosin  0.4 mg Oral Daily    apixaban  5 mg Oral BID    aspirin  81 mg Oral QAM    atorvastatin  10 mg Oral Nightly    vitamin D  5,000 Units Oral Daily    escitalopram  10 mg Oral Daily    alogliptin  12.5 mg Oral Daily    metoprolol succinate  50 mg Oral QAM    oxybutynin  5 mg Oral QAM    vitamin E  400 Units Oral QAM    sodium chloride flush  10 mL Intravenous 2 times per day    insulin lispro  0-12 Units Subcutaneous TID WC    insulin lispro  0-6 Units Subcutaneous Nightly    insulin glargine  12 Units Subcutaneous Nightly     Continuous Infusions:   dextrose       PRN Meds:sodium chloride flush, acetaminophen **OR** acetaminophen, polyethylene glycol, promethazine **OR** ondansetron, glucose, dextrose, glucagon (rDNA), dextrose      Objective:     I/O last 3 completed shifts: In: 180 [P.O.:180]  Out: 750 [Urine:750]  No intake/output data recorded.   /60   Pulse 61   Temp 96.8 °F (36 °C) (Infrared)   Resp 16   Ht 5' 7\" (1.702 m)   Wt 173 lb 15.1 oz (78.9 kg)   SpO2 96%   BMI 27.24 kg/m²   Neck: no adenopathy, no carotid bruit, no JVD, supple, symmetrical, trachea midline and thyroid not enlarged, no tenderness/mass/nodules  Lungs: Clear to A and P  Heart: regular rate and rhythm, S1, S2 normal, systolic murmur, regular rate and rhythm ,no precordial heave  Abdomen:soft, non-tender; non-distended normal bowel sounds no masses, no organomegaly  Extremities:Pedal edema Trace  Homans sign is negative, no sign of DVT    Data Review    CBC with Differential:    Lab Results   Component Value Date    WBC 8.2 08/14/2020    RBC 3.71 08/14/2020    HGB 11.3 08/14/2020    HCT 34.7 08/14/2020    PLT 165 08/14/2020    MCV 93.5 08/14/2020    MCH 30.5 08/14/2020    MCHC 32.6 08/14/2020    RDW 13.9 08/14/2020    SEGSPCT 74 05/11/2012    LYMPHOPCT 16.7 08/14/2020    MONOPCT 13.9 08/14/2020    BASOPCT 0.7 08/14/2020    MONOSABS 1.14 08/14/2020    LYMPHSABS 1.37 08/14/2020    EOSABS 0.13 08/14/2020    BASOSABS 0.06 08/14/2020     CMP:    Lab Results   Component Value Date     08/15/2020    K 3.8 08/15/2020    K 4.1 07/31/2020     08/15/2020    CO2 26 08/15/2020    BUN 38 08/15/2020    CREATININE 1.3 08/15/2020    GFRAA >60 08/15/2020    LABGLOM 52 08/15/2020    GLUCOSE 133 08/15/2020    GLUCOSE 117 05/11/2012    PROT 5.7 08/13/2020    LABALBU 3.0 08/13/2020    LABALBU 3.5 05/11/2012    CALCIUM 8.8 08/15/2020    BILITOT 0.6 08/13/2020    ALKPHOS 60 08/13/2020    AST 21 08/13/2020    ALT 13 08/13/2020     ECHO SUMMARY:   Normal left ventricle size and systolic function. Ejection fraction is visually estimated at 55-60%. No regional wall motion abnormalities seen. There is concentric remodelling. Stage I diastolic dysfunction. Normal right ventricular size. Right ventricle global systolic function is mildly reduced. TAPSE 15 mm. Mild-to-moderate mitral regurgitation with centrally directed jet. Mild aortic regurgitation is noted. There is mild-to-moderate aortic stenosis with valve area of 1.2 sq cm. RVSP is 29 mmHg. Normal estimated PA systolic pressure. No evidence for hemodynamically significant pericardial effusion. Compared to prior echo of 7/7/2017,changes noted. Now, has mild to   moderate aortic stenosis. XR CHEST (2 VW)   Final Result   Minimal right basilar airspace disease, likely atelectasis, without   significant pleural effusion. Cannot completely exclude aspiration   and/or infection in the right clinical setting.              Assessment:   Acute diastolic CHF-echo shows stage 1 DD, Mild-mod AS, MR. Mild to mod MR, Mild AR   Pancreatic cystic lesions-evaluation by EUS being considered. 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     Active Problems:    Congestive heart failure (HCC)    Dyspnea on exertion  Resolved Problems:    * No resolved hospital problems. *      Plan: Will resume oral lasix 20 mg daily. Resume amaryl today at 4 mg daily (home dose 8 mg daily). Ambulatory pulse ox/home 02 assessment. Await cardiology input re Echo and further recommendations going forward. Could d/c today/tomorrow depending upon  Recommendations.       Electronically signed by Tracy Vazquez MD on 8/15/2020 at 8:53 AM

## 2020-08-15 NOTE — PROGRESS NOTES
INPATIENT CARDIOLOGY FOLLOW-UP    Name: Maura Bradford    Age: 80 y.o. Date of Admission: 8/13/2020  6:31 PM    Date of Service: 8/15/2020    Chief Complaint: Follow-up for CHF    Interim History:  No new overnight cardiac complaints. Currently with no complaints of CP, SOB, palpitations, dizziness, or lightheadedness. SR on telemetry. Review of Systems:   Cardiac: As per HPI  General: No fever, chills  Pulmonary: As per HPI  HEENT: No visual disturbances, difficult swallowing  GI: No nausea, vomiting  Endocrine: No thyroid disease or DM  Musculoskeletal: KELLEY x 4, no focal motor deficits  Skin: Intact, no rashes  Neuro/Psych: No headache or seizures    Problem List:  Patient Active Problem List   Diagnosis    HTN (hypertension), benign    Mixed hyperlipidemia    Diabetes mellitus type 2, uncontrolled ([address]    Lower leg DVT (deep venous thromboembolism), acute, right ([address]    History of CVA (cerebrovascular accident)    Dysphagia    Pancreatic mass    Congestive heart failure (HCC)    Dyspnea on exertion       Allergies:   Allergies   Allergen Reactions    Bupivacaine     Niaspan [Niacin Er]     Vicodin [Hydrocodone-Acetaminophen]      hallucinations       Current Medications:  Current Facility-Administered Medications   Medication Dose Route Frequency Provider Last Rate Last Dose    furosemide (LASIX) tablet 20 mg  20 mg Oral Daily Mehreen Carr MD   20 mg at 08/15/20 1123    glimepiride (AMARYL) tablet 4 mg  4 mg Oral Daily with breakfast Mehreen Carr MD   4 mg at 08/15/20 1123    finasteride (PROSCAR) tablet 5 mg  5 mg Oral Daily Mehreen Carr MD   5 mg at 08/15/20 0859    tamsulosin (FLOMAX) capsule 0.4 mg  0.4 mg Oral Daily Mehreen Carr MD   0.4 mg at 08/15/20 0900    apixaban (ELIQUIS) tablet 5 mg  5 mg Oral BID Mehreen Carr MD   5 mg at 08/15/20 0859    aspirin EC tablet 81 mg  81 mg Oral QAM Mehreen Carr MD   81 mg at 08/15/20 0901    atorvastatin (LIPITOR) tablet 10 mg  10 Intravenous PRN Thelma Meraz MD        insulin glargine (LANTUS) injection vial 12 Units  12 Units Subcutaneous Nightly Thelma Meraz MD   12 Units at 08/14/20 2106      dextrose         Physical Exam:  /60   Pulse 61   Temp 96.8 °F (36 °C) (Infrared)   Resp 16   Ht 5' 7\" (1.702 m)   Wt 173 lb 15.1 oz (78.9 kg)   SpO2 96%   BMI 27.24 kg/m²   Wt Readings from Last 3 Encounters:   08/14/20 173 lb 15.1 oz (78.9 kg)   07/30/20 175 lb (79.4 kg)   11/30/19 169 lb (76.7 kg)     Appearance: Awake, alert, no acute respiratory distress  Skin: Intact, no rash  Head: Normocephalic, atraumatic  Eyes: EOMI, no conjunctival erythema  ENMT: No pharyngeal erythema, MMM, no rhinorrhea  Neck: Supple, no elevated JVP, no carotid bruits  Lungs: Clear to auscultation bilaterally. No wheezes, rales, or rhonchi.   Cardiac: Regular rate and rhythm, +S1S2, no murmurs apparent  Abdomen: Soft, nontender, +bowel sounds  Extremities: Moves all extremities x 4, no lower extremity edema  Neurologic: No focal motor deficits apparent, normal mood and affect  Peripheral Pulses: Intact posterior tibial pulses bilaterally    Intake/Output:    Intake/Output Summary (Last 24 hours) at 8/15/2020 1310  Last data filed at 8/15/2020 0810  Gross per 24 hour   Intake 300 ml   Output 425 ml   Net -125 ml     I/O this shift:  In: 120 [P.O.:120]  Out: -     Laboratory Tests:  Recent Labs     08/13/20 1931 08/14/20  0505 08/15/20  0658    137 139   K 4.4 4.2 3.8    105 105   CO2 25 24 26   BUN 23 26* 38*   CREATININE 1.0 1.2 1.3*   GLUCOSE 269* 166* 133*   CALCIUM 8.6 8.5* 8.8     Lab Results   Component Value Date    MG 1.8 08/15/2020     Recent Labs     08/13/20 1931   ALKPHOS 60   ALT 13   AST 21   PROT 5.7*   BILITOT 0.6   LABALBU 3.0*     Recent Labs     08/13/20 1931 08/14/20  0505   WBC 9.3 8.2   RBC 3.77* 3.71*   HGB 11.6* 11.3*   HCT 34.7* 34.7*   MCV 92.0 93.5   MCH 30.8 30.5   MCHC 33.4 32.6   RDW 13.7 13.9    165 MPV 10.1 10.8     Lab Results   Component Value Date    TROPONINI 0.02 08/14/2020    TROPONINI <0.01 08/14/2020    TROPONINI 0.02 08/13/2020     Lab Results   Component Value Date    INR 2.2 08/13/2020    INR 1.8 07/30/2020    INR 1.4 03/14/2019    PROTIME 26.2 (H) 08/13/2020    PROTIME 20.3 (H) 07/30/2020    PROTIME 16.0 (H) 03/14/2019     Lab Results   Component Value Date    TSH 4.970 (H) 08/14/2020     Lab Results   Component Value Date    LABA1C 8.6 (H) 07/30/2020     No results found for: EAG  Lab Results   Component Value Date    CHOL 97 08/14/2020    CHOL 94 07/07/2017     Lab Results   Component Value Date    TRIG 59 08/14/2020    TRIG 57 07/07/2017     Lab Results   Component Value Date    HDL 33 08/14/2020    HDL 33 07/07/2017     Lab Results   Component Value Date    LDLCALC 52 08/14/2020    LDLCALC 50 07/07/2017     Lab Results   Component Value Date    LABVLDL 12 08/14/2020    LABVLDL 11 07/07/2017     No results found for: CHOLHDLRATIO    Cardiac Tests:  Telemetry findings reviewed: SR at rate 60s, no new tachy/bradyarrhythmias overnight     EKG: Sinus bradycardia, left axis deviation, right bundle branch block, abnormal EKG.    Vitals: Blood pressure 146/71 with heart rate of 66 sats 97% on 2 L of oxygen.     Labs were reviewed: BUN/creatinine 23/1>> 26/1.2 rest of the chemistries normal.  proBNP 1845, troponins x3 are negative, cholesterol 97, LDL 52 HDL 33.   Liver function normal, TSH 4.9, WBC 8.2 H&H 11.3/34.7 platelets 525.     CT of the abdomen: Numerous low-density enhancing masses within the pancreas diffusely involving the head the body and the tail the dominant lesion in the body measuring about 1.9 x 1.9 cm these lesions have the appearance of numerous cysts.     Lexiscan nuclear stress test 7/6/2017: LVEF 75%, small reversible defect in the proximal inferolateral region concerning for reversible ischemia.     TTE-7/6/2017: LVEF 55%, normal LV size and function, proximal septal thickening without any LVOT obstruction. TTE- 9/14/2020:  Summary   Normal left ventricle size and systolic function. Ejection fraction is visually estimated at 55-60%. No regional wall motion abnormalities seen. There is concentric remodelling. Stage I diastolic dysfunction. Normal right ventricular size. Right ventricle global systolic function is mildly reduced. TAPSE 15 mm. Mild-to-moderate mitral regurgitation with centrally directed jet. Mild aortic regurgitation is noted. There is mild-to-moderate aortic stenosis with valve area of 1.2 sq cm. RVSP is 29 mmHg. Normal estimated PA systolic pressure. No evidence for hemodynamically significant pericardial effusion. Compared to prior echo of 7/7/2017,changes noted. Now, has mild to   moderate aortic stenosis.     Chest x-ray: Minimal right basilar airspace disease likely atelectasis without any significant pleural effusion. No CHF     Assessment:  · Exertional dyspnea most likely heart failure with preserved ejection fraction secondary diastolic dysfunction. Currently appears euvolemic and hemodynamically stable. · History of abnormal stress test with small area of ischemia on medical therapy  · Hyperlipidemia  · Type 2 diabetes  · CVA  · History of pulmonary embolism in the past  · SNEHA secondary to diuretic therapy   · VHD: mild to moderate AS, mild AR and mild to mod MR        Plan:   ·  Hold lasix tomorrow and check BMP on Tuesday  · Continue current medications including Eliquis 5 mg twice daily for anticoagulation  · Continue aspirin and atorvastatin. · Continue Toprol-XL 50 mg twice daily  · Echo results discussed with the patient and his son  · He is stable from the cardiology standpoint and follow up with Dr. Parth Leos in one month      Berenice Barkley MD., [facility] [place], [address]   [facility]) Cardiology

## 2020-08-15 NOTE — PROGRESS NOTES
Pulse ox was 96______% on room air at rest.  Ambulated patient on room air. Oxygen saturation was _86_____% on room air while ambulating. Oxygen applied. Recovery pulse ox was _92_____% on ___4____ liters of oxygen while ambulating.

## 2020-08-17 ENCOUNTER — CARE COORDINATION (OUTPATIENT)
Dept: CASE MANAGEMENT | Age: 85
End: 2020-08-17

## 2020-08-17 NOTE — CARE COORDINATION
[address] Transitions Initial Follow Up Call    Call within 2 business days of discharge: Yes    Patient: Rodrigo Castro Patient : 1927   MRN: [id number]  Reason for Admission: BARRAGAN   Discharge Date: 8/15/20 RARS: Readmission Risk Score: 18      Last Discharge [facility]       Complaint Diagnosis Description Type Department Provider    20 Shortness of Breath Dyspnea on exertion . .. ED to Hosp-Admission (Discharged) (ADMITTED) INDU WallerW Radha Ramirez MD; [place]. .. Spoke with: Thierry's son Albret Abbasi: [address] services provided:  Obtained and reviewed discharge summary and/or continuity of care documents    Care Transitions 24 Hour Call    Schedule Follow Up Appointment with PCP:  Declined  Do you have any ongoing symptoms?:  No  Do you have a copy of your discharge instructions?:  Yes  Do you have all of your prescriptions and are they filled?:  Yes  Have you been contacted by a [facility] Pharmacist?:  No  Have you scheduled your follow up appointment?:  No  Were you discharged with any Home Care or Post Acute Services:  No  Do you feel like you have everything you need to keep you well at home?:  Yes  Care Transitions Interventions  No Identified Needs     Initial call made to listed number, which ended up being for Thierry's daughter Jie Gordon. She reports that he is doing \"very good\" and then provided CTN with his home number. Spoke with Thierry's son Christopher Tong for initial BPCI care transition call post hospital discharge. Explained the role of Care Transition Nurse and the BPCI-A program, he is agreeable to follow up post discharge from the hospital. Christopher Tong reports that his dad is doing [place] good\" today. He denies any edema or SOB today. He stated they do not have a scale and doesn't think his dad could safely stand at a scale anyway.  He stated his dad is wearing 4L O2 and feels it may be more of a \"placebo\" at this point because his pulse ox reading has been in the \"upper 90's\" since returning home. Jose Grigsby plans to take his dad to [facility] tomorrow for the ordered BMP blood work. He confirmed that they have paid caregivers and family with Jose Grigsby 24 hours a day. Med review not completed at this time per [facility] request, he stated they have all of his father's medications and understand them. CTN explained that a member of the Care Transition Central Team will be contacting them for further follow up calls, he is in agreement and denies any other needs or concerns at this time.      Follow Up  Future Appointments   Date Time Provider Eulalia Arias   9/8/2020 10:00 AM Sukhi Wong MD [facility]   9/14/2020 12:30 PM Praneeth Freeman MD [facility]       Milady Davila RN

## 2020-08-18 ENCOUNTER — HOSPITAL ENCOUNTER (OUTPATIENT)
Age: 85
Discharge: HOME | End: 2020-08-18
Payer: MEDICARE

## 2020-08-18 ENCOUNTER — TELEPHONE (OUTPATIENT)
Dept: SURGERY | Age: 85
End: 2020-08-18

## 2020-08-18 LAB
ANION GAP SERPL CALCULATED.3IONS-SCNC: 9 MMOL/L (ref 7–16)
BUN BLDV-MCNC: 21 MG/DL (ref 8–23)
CALCIUM SERPL-MCNC: 9.2 MG/DL (ref 8.6–10.2)
CHLORIDE BLD-SCNC: 104 MMOL/L (ref 98–107)
CO2: 28 MMOL/L (ref 22–29)
CREAT SERPL-MCNC: 1 MG/DL (ref 0.7–1.2)
GFR AFRICAN AMERICAN: >60
GFR NON-AFRICAN AMERICAN: >60 ML/MIN/1.73
GLUCOSE BLD-MCNC: 133 MG/DL (ref 74–99)
POTASSIUM SERPL-SCNC: 4.3 MMOL/L (ref 3.5–5)
SODIUM BLD-SCNC: 141 MMOL/L (ref 132–146)

## 2020-08-18 PROCEDURE — 36415 COLL VENOUS BLD VENIPUNCTURE: CPT

## 2020-08-18 PROCEDURE — 80048 BASIC METABOLIC PNL TOTAL CA: CPT

## 2020-08-18 NOTE — TELEPHONE ENCOUNTER
Call received from PAT. Patients family does not want to proceed with procedure. Patient has CHF and they do not want to put patient though the process. Call placed to surgery scheduling to cancel procedure. Chart forwarded to Dr. Jammie Nunn to inform him of cancellation.   Electronically signed by Sunny Hansen on 8/18/20 at 3:10 PM EDT

## 2020-08-25 ENCOUNTER — CARE COORDINATION (OUTPATIENT)
Dept: CASE MANAGEMENT | Age: 85
End: 2020-08-25

## 2020-08-25 NOTE — CARE COORDINATION
[address] Transitions Follow Up Call    2020    Patient: Adelaide Whitley  Patient : 1927   MRN: <[id number]>  Reason for Admission:   Discharge Date: 8/15/20 RARS: Readmission Risk Score: 25         Spoke with: Jeremy Dexter, daughter    Patient's daughter states her father is doing well for 80years old. He gets SOB occasionally and then wears O2at4/L. No weight gain, wheezing, coughing, edema, abdominal edema. Patient has all medications is taking medications as directed and has no questions concerning medications at this time. Patient's daughter knows when to contact physician or report to ED with worsening or severe symptoms, changes, or concerns. Explained the BPCI-A program to patient's daughter and that they are followed for 90 days after discharge. Expresses understanding. Will Follow up at later time. Carmen Arce LPN     [facility] / [address] [facility] Transitions Subsequent and Final Call    Subsequent and Final Calls  Do you have any ongoing symptoms?:  Yes  Patient-reported symptoms:  Shortness of Breath  Have your medications changed?:  No  Do you have any questions related to your medications?:  No  Do you currently have any active services?:  No  Do you have any needs or concerns that I can assist you with?:  No  Identified Barriers:  None  Care Transitions Interventions  Other Interventions:             Follow Up  Future Appointments   Date Time Provider Eulalia Arias   2020 10:00 AM Christine Ventura MD [facility]   2020 12:30 PM Clay Gayle MD [address] [place] [address] Dr, LPN

## 2020-09-12 PROBLEM — C25.9 CANCER OF PANCREAS (HCC): Status: ACTIVE | Noted: 2020-09-12

## 2020-09-12 PROBLEM — I50.32 CHRONIC HEART FAILURE WITH PRESERVED EJECTION FRACTION (HCC): Status: ACTIVE | Noted: 2020-09-12

## 2020-09-15 ENCOUNTER — CARE COORDINATION (OUTPATIENT)
Dept: CASE MANAGEMENT | Age: 85
End: 2020-09-15

## 2020-09-15 NOTE — CARE COORDINATION
Needs to be reviewed by the provider   Additional needs identified to be addressed with provider No  none  Discussed COVID-19 related testing which was not done at this time. Test results were NA. Patient informed of results, if available? NA             Care Transition Nurse (CTN) contacted the patient by telephone to follow up after admission on -8/15. Verified name and  with patient as identifiers. Addressed changes since last contact: symptom management-Continues to have SOB with exertion, denies worsening. Denies CP, palpitations, weight gain, swelling and medication management-Reports taking as prescribed  Discharged needs reviewed: none  Follow up appointment completed? Yes      CTN reviewed discharge instructions, medical action plan and red flags with patient and discussed any barriers to care and/or understanding of plan of care after discharge. Discussed appropriate site of care based on symptoms and resources available to patient including: PCP, Specialist and When to call 911. The patient agrees to contact the PCP office for questions related to their healthcare. Patients top risk factors for readmission: medical condition  Interventions to address risk factors: Education of patient/family/caregiver/guardian to support self-management-Reviewed to call MD with new or worsening symptoms, or weight gain 3 lb/day or 5 lb/week      Plan for follow-up call in 5-7 days based on severity of symptoms and risk factors. Plan for next call: symptom management-  and medication management-   CTN provided contact information for future needs.

## 2020-09-21 ENCOUNTER — OFFICE VISIT (OUTPATIENT)
Dept: CARDIOLOGY CLINIC | Age: 85
End: 2020-09-21
Payer: MEDICARE

## 2020-09-21 VITALS
HEIGHT: 68 IN | SYSTOLIC BLOOD PRESSURE: 102 MMHG | BODY MASS INDEX: 26.84 KG/M2 | HEART RATE: 71 BPM | DIASTOLIC BLOOD PRESSURE: 62 MMHG | RESPIRATION RATE: 16 BRPM

## 2020-09-21 PROCEDURE — 1123F ACP DISCUSS/DSCN MKR DOCD: CPT | Performed by: INTERNAL MEDICINE

## 2020-09-21 PROCEDURE — 1036F TOBACCO NON-USER: CPT | Performed by: INTERNAL MEDICINE

## 2020-09-21 PROCEDURE — 99215 OFFICE O/P EST HI 40 MIN: CPT | Performed by: INTERNAL MEDICINE

## 2020-09-21 PROCEDURE — G8427 DOCREV CUR MEDS BY ELIG CLIN: HCPCS | Performed by: INTERNAL MEDICINE

## 2020-09-21 PROCEDURE — G8417 CALC BMI ABV UP PARAM F/U: HCPCS | Performed by: INTERNAL MEDICINE

## 2020-09-21 PROCEDURE — 93000 ELECTROCARDIOGRAM COMPLETE: CPT | Performed by: INTERNAL MEDICINE

## 2020-09-21 PROCEDURE — 4040F PNEUMOC VAC/ADMIN/RCVD: CPT | Performed by: INTERNAL MEDICINE

## 2020-09-21 NOTE — PROGRESS NOTES
Chief Complaint   Patient presents with    Heart Problem       Patient Active Problem List    Diagnosis Date Noted    Chronic heart failure with preserved ejection fraction ([facility] 09/12/2020    Cancer of pancreas ([facility] 09/12/2020    Lower leg DVT (deep venous thromboembolism), acute, right ([facility] 03/15/2019    History of CVA (cerebrovascular accident) 03/15/2019    Diabetes mellitus type 2, uncontrolled ([facility] 07/06/2017    HTN (hypertension), benign 05/08/2012    Mixed hyperlipidemia 05/08/2012       Current Outpatient Medications   Medication Sig Dispense Refill    apixaban (ELIQUIS) 2.5 MG TABS tablet Take by mouth 2 times daily      furosemide (LASIX) 20 MG tablet Take 1 tablet by mouth See Admin Instructions (Patient taking differently: Take 20 mg by mouth daily ) 60 tablet 3    dutasteride (AVODART) 0.5 MG capsule Take 0.5 mg by mouth daily      tamsulosin (FLOMAX) 0.4 MG capsule Take 0.4 mg by mouth daily      glimepiride (AMARYL) 4 MG tablet Take 2 tablets by mouth daily (with breakfast) 60 tablet 0    insulin detemir (LEVEMIR FLEXTOUCH) 100 UNIT/ML injection pen Inject 12 Units into the skin every evening (Patient taking differently: Inject 15 Units into the skin every evening ) 5 pen 3    escitalopram (LEXAPRO) 10 MG tablet Take 10 mg by mouth daily      Cholecalciferol (VITAMIN D3) 5000 units TABS Take 5,000 Units by mouth      atorvastatin (LIPITOR) 10 MG tablet Take 10 mg by mouth nightly       Multiple Vitamins-Minerals (MULTIVITAMIN ADULT PO) Take 1 tablet by mouth every other day       aspirin 81 MG tablet Take 81 mg by mouth every morning       linagliptin (TRADJENTA) 5 MG tablet Take 5 mg by mouth daily      metoprolol (TOPROL-XL) 50 MG XL tablet Take 50 mg by mouth every morning       apixaban (ELIQUIS) 5 MG TABS tablet Take 1 tablet by mouth 2 times daily (Patient not taking: Reported on 9/21/2020) 60 tablet 0    oxybutynin (DITROPAN) 5 MG tablet Take 5 mg by mouth every morning       ketoconazole (NIZORAL) 2 % cream Apply topically daily Apply topically daily.  vitamin E 400 UNIT capsule Take 400 Units by mouth every morning        No current facility-administered medications for this visit.          Allergies   Allergen Reactions    Bupivacaine     Niaspan [Niacin Er]     Vicodin [Hydrocodone-Acetaminophen]      hallucinations       Vitals:    20 1237   BP: 102/62   Pulse: 71   Resp: 16   Height: 5' 7.5\" (1.715 m)       Social History     Socioeconomic History    Marital status:      Spouse name: Not on file    Number of children: Not on file    Years of education: Not on file    Highest education level: Not on file   Occupational History    Not on file   Social Needs    Financial resource strain: Not on file    Food insecurity     Worry: Not on file     Inability: Not on file    Transportation needs     Medical: Not on file     Non-medical: Not on file   Tobacco Use    Smoking status: Former Smoker     Types: Cigarettes     Last attempt to quit: 1973     Years since quittin.3    Smokeless tobacco: Never Used   Substance and Sexual Activity    Alcohol use: No     Alcohol/week: 0.0 standard drinks    Drug use: No    Sexual activity: Never   Lifestyle    Physical activity     Days per week: Not on file     Minutes per session: Not on file    Stress: Not on file   Relationships    Social connections     Talks on phone: Not on file     Gets together: Not on file     Attends Jainism service: Not on file     Active member of club or organization: Not on file     Attends meetings of clubs or organizations: Not on file     Relationship status: Not on file    Intimate partner violence     Fear of current or ex partner: Not on file     Emotionally abused: Not on file     Physically abused: Not on file     Forced sexual activity: Not on file   Other Topics Concern    Not on file   Social History Narrative    Not on file       Family History Problem Relation Age of Onset    Heart Disease Mother          SUBJECTIVE: Jerry Villa presents to the office today for re-evaluation of cardiac diagnoses and hfu.  I have not seen him in four years. I reviewed recent hospital records, cxr and echo images. Has what sounds like pancreatic cancer with palliative approach He was admitted with sob and felt to have volume overload. Given IV lasix and creat bumped. Sent home on qd oral .  He complains of dyspnea and fatigue and denies   chest pain, palpitations and syncope. Review of Systems:   Heart: as above   Lungs: as above   Eyes: denies changes in vision or discharge. Ears: denies changes in hearing or pain. Nose: denies epistaxis or masses   Throat: denies sore throat or trouble swallowing. Neuro: denies numbness, tingling, tremors. Skin: denies rashes or itching. : denies hematuria, dysuria   GI: denies vomiting, diarrhea   Psych: denies mood changed, anxiety, depression. all others negative. Physical Exam   /62   Pulse 71   Resp 16   Ht 5' 7.5\" (1.715 m)   BMI 26.84 kg/m²   Constitutional: Oriented to person, place, and time. . No distress. Head: Normocephalic and atraumatic. Eyes: EOM are normal. Pupils are equal, round, and reactive to light. Neck: Normal range of motion. Neck supple. No hepatojugular reflux and no JVD present. Carotid bruit is not present. No tracheal deviation present. No thyromegaly present. Cardiovascular: Normal rate, regular rhythm, normal heart sounds and intact distal pulses. Exam reveals no gallop and no friction rub. No murmur heard. Pulmonary/Chest: Effort normal and breath sounds normal. No respiratory distress. No wheezes. No rales. No tenderness. Abdominal: Soft. Bowel sounds are normal. No distension and no mass. No tenderness. No rebound and no guarding. Musculoskeletal: wheelchair. No edema and no tenderness. Neurological: Alert and oriented to person, place, and time. Skin: Skin is warm and dry. No rash noted. Not diaphoretic. No erythema. Psychiatric: Normal mood and affect. Behavior is normal.     EKG:  normal sinus rhythm, RBBB, left axis deviation,  remote anterolateral MI , unchanged from previous tracings.     ASSESSMENT AND PLAN:  Patient Active Problem List   Diagnosis    HTN (hypertension), benign    Mixed hyperlipidemia    Diabetes mellitus type 2, uncontrolled ([address]    Lower leg DVT (deep venous thromboembolism), acute, right ([address]    History of CVA (cerebrovascular accident)    Chronic heart failure with preserved ejection fraction ([address]    Cancer of pancreas ([address]     Elderly gent with multiple medical problems including pancreatic cancer  Palliative care  Not out of realm of possibility he has HFpEF.   euvolemic today, eliminate every third day lasix  No testing planned      WEN [facility]

## 2020-09-22 ENCOUNTER — CARE COORDINATION (OUTPATIENT)
Dept: CASE MANAGEMENT | Age: 85
End: 2020-09-22

## 2020-10-05 ENCOUNTER — CARE COORDINATION (OUTPATIENT)
Dept: CASE MANAGEMENT | Age: 85
End: 2020-10-05

## 2020-10-05 NOTE — CARE COORDINATION
[address]  [facility] Transitions Bundled Payments for Care Improvement (BPCI) Follow Up Call  Qualifying Diagnosis of Congestive Heart Failure    10/5/2020  Patient Name:  Rose Mary Thrasher   YOB: 1927  Discharge Date:  8/15/20  RARS:  Readmission Risk Score: 18    PCP:  Ajit Bustos MD     Assessment:          Short of Breath: occasionally, oxygen prn   Fatigue: not unusual, 'I'm 80years old\".  Wheezing: denies   Swelling feet, ankles: denies   Chest Pain: denies   Daily weight: 170-175, \"I stay about the same\". Educated importance of calling physician with increased dyspnea, weight gain 3# in 1 day, 5#'s in 3 days. V/U   Appetite: \"too good\"   Sleep Pattern good   Need to Elevate Head to Sleep Better: denies   Nausea, Sweating: denies   Heart palpitations:  denies   Dizziness: seldom, from, \" over exertion\"  [address]: yes, aide \"gets meals for us\"   Medication Needs/Questions: denies   Transportation Need:  Denies, \"my kids take me to where I need to go\"   Live Alone:  spouse   Ability to [employer], [place]: OK   Follow Up Appointment Scheduled: completed   Do you feel like you have everything you need to stay well at home? yes    No future appointments.     Care Transitions will continue to follow per [facility] Program.  Alexa Meehan, NALINI, CTN

## 2020-10-19 NOTE — CARE COORDINATION
[facility] Transitions Follow Up Call    10/19/2020    Patient: Severiano Pique  Patient : 1927   MRN: <[id number]>  Reason for Admission:   Discharge Date: 8/15/20 RARS: Readmission Risk Score: 25         Spoke with: Daughter, Tam Traore    Needs to be reviewed by the provider        Method of communication with provider : none    Care Transition Nurse (CTN) contacted the family by telephone to follow up after admission on 2020. Verified name and  with family as identifiers. Addressed changes since last contact: symptom management-CHF  Discharged needs reviewed: none  Follow up appointment completed? Yes    Advance Care Planning:   Does patient have an Advance Directive: On file. CTN reviewed discharge instructions, medical action plan and red flags with family and discussed any barriers to care and/or understanding of plan of care after discharge. Discussed appropriate site of care based on symptoms and resources available to patient including: When to call 911. The family agrees to contact the PCP office for questions related to their healthcare. Patients top risk factors for readmission: medical condition  Interventions to address risk factors: Obtained and reviewed discharge summary and/or continuity of care documents    Discussed follow-up appointments. If no appointment was previously scheduled, appointment scheduling offered: Yes     Is follow up appointment scheduled within 7 days of discharge? Yes  Non-[facility] follow up appointment(s): Yes    Plan for follow-up call in 10-14 days based on severity of symptoms and risk factors. Plan for next call: symptom management-CHF  CTN provided contact information for future needs. Patient's daughter states patient is dong well. No weight gain, SOB or wheezing, coughing, edema, abdominal edema, adhering to low NA diet and fluid restriction.    Patient has all medications is taking medications as directed and has no questions concerning medications at this time. Uses Oxygen PRN 2/L. Ileana Rhymes in house with cane. Uses Wheelchair when goes out. Appetite good, drinking adequate fluids. Normal elimination patterns (BM & Urination). Patient has no needs or concerns for writer at this time. Patient's daughter knows when to contact physician or report to ED with worsening or severe symptoms, changes, or concerns. Explained the BPCI-A program and that the patient is followed for 90 days after discharge. Expresses understanding. Will Follow up at later time. Sarah Farfan LPN     [facility] Transitions Subsequent and Final Call    Subsequent and Final Calls  Do you have any ongoing symptoms?:  No  Have your medications changed?:  No  Do you have any questions related to your medications?:  No  Do you currently have any active services?:  No  Do you have any needs or concerns that I can assist you with?:  No  Identified Barriers:  None  Care Transitions Interventions  Other Interventions: Follow Up  No future appointments.     Sarah Farfan LPN

## 2020-10-31 NOTE — CARE COORDINATION
[facility] Transitions Follow Up Call    10/31/2020    Patient: Ernestina Agosto  Patient : 1927   MRN: [id number]  Reason for Admission:   Discharge Date: 8/15/20 RARS: Readmission Risk Score: 18         Spoke with:  Patient's daughter, Remigio Crisostomo. Loan Gandara is providing patient update on the Subsequent Call. Care Transitions Subsequent and Final Call    Subsequent and Final Calls  Do you have any ongoing symptoms?:  Yes  Patient-reported symptoms:  Shortness of Breath  -reports shortness of breath with exertion is at baseline; oxygen at 2 LNC continuously  -problem swallowing foods  Do you have any questions related to your medications?:  No  Do you have any needs or concerns that I can assist you with?:  No  Identified Barriers:  Impairment  Care Transitions Interventions; no identified needs    Loan Gandara reports patient had an issue with \"choking\" after drinking coffee and eating a cookie; states she thought she would have to call 911, however, the patient recovered. -reports no further episodes of choking   -reports patient has had swallowing problems in the past   -reports aides/caregivers are in the home   -denies patient with any C/O chest pain, palpitations, lightheaded, or dizziness   -denies patient with any swelling   -denies patient with weight gain  -normal elimination patterns    Discussed/reviewed aides to supervise patient with meals. Discussed patient needs follow up regarding swallowing problems and Loan Gandara agrees to call Dr. Stephany Garcia on Monday for advice. Discussed to call 911 with any new or worsening in symptoms; Mary verbalizes understanding. Emotional support provided; discussed will continue to follow. Follow Up  No future appointments.     Major Gonzalez RN

## 2020-11-06 NOTE — CARE COORDINATION
[facility] Transitions Follow Up Call    2020    Patient: Ami Mehta  Patient : 1927   MRN: [id number]  Reason for Admission:   Discharge Date: 8/15/20 RARS: Readmission Risk Score: 18       Attempted to reach patient's daughter, Davon Wu, by phone for patient follow up; BPCI-A. HIPAA compliant message left on voicemail; CTN contact information provided.      Chuy Cuello RN

## 2020-11-11 NOTE — CARE COORDINATION
[facility] Transitions Follow Up Call    2020    Patient: Chino Mcpherson  Patient : 1927   MRN: <[id number]>  Reason for Admission: Dyspnea on exertion  Discharge Date: 8/15/20 RARS: Readmission Risk Score: 25         Spoke with: Daughter    Daughter stated she is in an office right now, cannot talk. Informed of final BPCI-A call, encouraged her to call with questions or concerns. Care Transitions Subsequent and Final Call    Subsequent and Final Calls  Care Transitions Interventions  Other Interventions: Follow Up  No future appointments.     Emmett Arevalo RN

## 2021-01-01 ENCOUNTER — CARE COORDINATION (OUTPATIENT)
Dept: CASE MANAGEMENT | Age: 86
End: 2021-01-01

## 2021-01-01 ENCOUNTER — HOSPITAL ENCOUNTER (EMERGENCY)
Age: 86
Discharge: HOME | End: 2021-10-06
Attending: EMERGENCY MEDICINE
Payer: MEDICARE

## 2021-01-01 ENCOUNTER — APPOINTMENT (OUTPATIENT)
Dept: GENERAL RADIOLOGY | Age: 86
End: 2021-01-01
Payer: MEDICARE

## 2021-01-01 ENCOUNTER — HOSPITAL ENCOUNTER (EMERGENCY)
Age: 86
Discharge: ANOTHER HOSPITAL | End: 2021-10-03
Attending: EMERGENCY MEDICINE
Payer: MEDICARE

## 2021-01-01 ENCOUNTER — APPOINTMENT (OUTPATIENT)
Dept: GENERAL RADIOLOGY | Age: 86
DRG: 183 | End: 2021-01-01
Payer: MEDICARE

## 2021-01-01 ENCOUNTER — HOSPITAL ENCOUNTER (INPATIENT)
Age: 86
LOS: 1 days | Discharge: HOME | DRG: 183 | End: 2021-10-05
Attending: EMERGENCY MEDICINE | Admitting: INTERNAL MEDICINE
Payer: MEDICARE

## 2021-01-01 ENCOUNTER — APPOINTMENT (OUTPATIENT)
Dept: CT IMAGING | Age: 86
DRG: 183 | End: 2021-01-01
Payer: MEDICARE

## 2021-01-01 ENCOUNTER — APPOINTMENT (OUTPATIENT)
Dept: CT IMAGING | Age: 86
End: 2021-01-01
Payer: MEDICARE

## 2021-01-01 VITALS
RESPIRATION RATE: 16 BRPM | WEIGHT: 170 LBS | BODY MASS INDEX: 25.76 KG/M2 | OXYGEN SATURATION: 94 % | HEIGHT: 68 IN | DIASTOLIC BLOOD PRESSURE: 66 MMHG | SYSTOLIC BLOOD PRESSURE: 119 MMHG | TEMPERATURE: 97.7 F | HEART RATE: 78 BPM

## 2021-01-01 VITALS
SYSTOLIC BLOOD PRESSURE: 85 MMHG | HEART RATE: 117 BPM | BODY MASS INDEX: 25.46 KG/M2 | HEIGHT: 68 IN | TEMPERATURE: 97.6 F | OXYGEN SATURATION: 91 % | RESPIRATION RATE: 30 BRPM | DIASTOLIC BLOOD PRESSURE: 52 MMHG | WEIGHT: 168 LBS

## 2021-01-01 VITALS
RESPIRATION RATE: 18 BRPM | TEMPERATURE: 97 F | HEART RATE: 84 BPM | OXYGEN SATURATION: 97 % | DIASTOLIC BLOOD PRESSURE: 70 MMHG | SYSTOLIC BLOOD PRESSURE: 128 MMHG

## 2021-01-01 DIAGNOSIS — S22.41XA CLOSED FRACTURE OF MULTIPLE RIBS OF RIGHT SIDE, INITIAL ENCOUNTER: Primary | ICD-10-CM

## 2021-01-01 DIAGNOSIS — J12.82 PNEUMONIA DUE TO COVID-19 VIRUS: Primary | ICD-10-CM
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
## 2021-01-01 DIAGNOSIS — R13.10 DYSPHAGIA, UNSPECIFIED TYPE: ICD-10-CM
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
## 2021-01-01 DIAGNOSIS — U07.1 PNEUMONIA DUE TO COVID-19 VIRUS: Primary | ICD-10-CM

## 2021-01-01 DIAGNOSIS — J96.21 ACUTE ON CHRONIC RESPIRATORY FAILURE WITH HYPOXIA (HCC): ICD-10-CM

## 2021-01-01 DIAGNOSIS — D68.9 COAGULOPATHY (HCC): ICD-10-CM

## 2021-01-01 DIAGNOSIS — S22.41XA CLOSED FRACTURE OF MULTIPLE RIBS OF RIGHT SIDE, INITIAL ENCOUNTER: ICD-10-CM

## 2021-01-01 DIAGNOSIS — U07.1 COVID: ICD-10-CM

## 2021-01-01 LAB
ALBUMIN SERPL-MCNC: 2.9 G/DL (ref 3.5–5.2)
ALP BLD-CCNC: 65 U/L (ref 40–129)
ALT SERPL-CCNC: 19 U/L (ref 0–40)
ANION GAP SERPL CALCULATED.3IONS-SCNC: 10 MMOL/L (ref 7–16)
ANION GAP SERPL CALCULATED.3IONS-SCNC: 12 MMOL/L (ref 7–16)
AST SERPL-CCNC: 37 U/L (ref 0–39)
BASOPHILS ABSOLUTE: 0 E9/L (ref 0–0.2)
BASOPHILS RELATIVE PERCENT: 0 % (ref 0–2)
BILIRUB SERPL-MCNC: 0.3 MG/DL (ref 0–1.2)
BUN BLDV-MCNC: 26 MG/DL (ref 6–23)
BUN BLDV-MCNC: 30 MG/DL (ref 6–23)
CALCIUM SERPL-MCNC: 8.5 MG/DL (ref 8.6–10.2)
CALCIUM SERPL-MCNC: 9.3 MG/DL (ref 8.6–10.2)
CHLORIDE BLD-SCNC: 100 MMOL/L (ref 98–107)
CHLORIDE BLD-SCNC: 103 MMOL/L (ref 98–107)
CO2: 19 MMOL/L (ref 22–29)
CO2: 27 MMOL/L (ref 22–29)
CREAT SERPL-MCNC: 1.1 MG/DL (ref 0.7–1.2)
CREAT SERPL-MCNC: 1.2 MG/DL (ref 0.7–1.2)
EKG ATRIAL RATE: 82 BPM
EKG P AXIS: 21 DEGREES
EKG P-R INTERVAL: 186 MS
EKG Q-T INTERVAL: 392 MS
EKG QRS DURATION: 118 MS
EKG QTC CALCULATION (BAZETT): 457 MS
EKG R AXIS: -28 DEGREES
EKG T AXIS: -16 DEGREES
EKG VENTRICULAR RATE: 82 BPM
EOSINOPHILS ABSOLUTE: 0 E9/L (ref 0.05–0.5)
EOSINOPHILS RELATIVE PERCENT: 0 % (ref 0–6)
GFR AFRICAN AMERICAN: >60
GFR AFRICAN AMERICAN: >60
GFR NON-AFRICAN AMERICAN: 56 ML/MIN/1.73
GFR NON-AFRICAN AMERICAN: >60 ML/MIN/1.73
GLUCOSE BLD-MCNC: 303 MG/DL (ref 74–99)
GLUCOSE BLD-MCNC: 90 MG/DL (ref 74–99)
HBA1C MFR BLD: 9 % (ref 4–5.6)
HCT VFR BLD CALC: 40.6 % (ref 37–54)
HCT VFR BLD CALC: 40.7 % (ref 37–54)
HEMOGLOBIN: 13.2 G/DL (ref 12.5–16.5)
HEMOGLOBIN: 13.4 G/DL (ref 12.5–16.5)
IMMATURE GRANULOCYTES #: 0.02 E9/L
IMMATURE GRANULOCYTES %: 0.3 % (ref 0–5)
LYMPHOCYTES ABSOLUTE: 1.11 E9/L (ref 1.5–4)
LYMPHOCYTES RELATIVE PERCENT: 15.8 % (ref 20–42)
MCH RBC QN AUTO: 29.8 PG (ref 26–35)
MCH RBC QN AUTO: 30.3 PG (ref 26–35)
MCHC RBC AUTO-ENTMCNC: 32.5 % (ref 32–34.5)
MCHC RBC AUTO-ENTMCNC: 32.9 % (ref 32–34.5)
MCV RBC AUTO: 90.4 FL (ref 80–99.9)
MCV RBC AUTO: 93.1 FL (ref 80–99.9)
METER GLUCOSE: 216 MG/DL (ref 74–99)
METER GLUCOSE: 234 MG/DL (ref 74–99)
METER GLUCOSE: 258 MG/DL (ref 74–99)
METER GLUCOSE: 263 MG/DL (ref 74–99)
METER GLUCOSE: 283 MG/DL (ref 74–99)
METER GLUCOSE: 295 MG/DL (ref 74–99)
METER GLUCOSE: 317 MG/DL (ref 74–99)
METER GLUCOSE: 337 MG/DL (ref 74–99)
METER GLUCOSE: 357 MG/DL (ref 74–99)
METER GLUCOSE: 403 MG/DL (ref 74–99)
METER GLUCOSE: 411 MG/DL (ref 74–99)
MONOCYTES ABSOLUTE: 0.59 E9/L (ref 0.1–0.95)
MONOCYTES RELATIVE PERCENT: 8.4 % (ref 2–12)
NEUTROPHILS ABSOLUTE: 5.31 E9/L (ref 1.8–7.3)
NEUTROPHILS RELATIVE PERCENT: 75.5 % (ref 43–80)
PDW BLD-RTO: 13.5 FL (ref 11.5–15)
PDW BLD-RTO: 13.5 FL (ref 11.5–15)
PLATELET # BLD: 141 E9/L (ref 130–450)
PLATELET # BLD: 147 E9/L (ref 130–450)
PMV BLD AUTO: 10.4 FL (ref 7–12)
PMV BLD AUTO: 9.8 FL (ref 7–12)
POTASSIUM REFLEX MAGNESIUM: 4.8 MMOL/L (ref 3.5–5)
POTASSIUM SERPL-SCNC: 4.5 MMOL/L (ref 3.5–5)
RBC # BLD: 4.36 E12/L (ref 3.8–5.8)
RBC # BLD: 4.5 E12/L (ref 3.8–5.8)
SARS-COV-2, NAAT: DETECTED
SODIUM BLD-SCNC: 134 MMOL/L (ref 132–146)
SODIUM BLD-SCNC: 137 MMOL/L (ref 132–146)
STREP GRP A PCR: NEGATIVE
TOTAL PROTEIN: 6.4 G/DL (ref 6.4–8.3)
WBC # BLD: 7 E9/L (ref 4.5–11.5)
WBC # BLD: 9.8 E9/L (ref 4.5–11.5)

## 2021-01-01 PROCEDURE — 6370000000 HC RX 637 (ALT 250 FOR IP): Performed by: STUDENT IN AN ORGANIZED HEALTH CARE EDUCATION/TRAINING PROGRAM

## 2021-01-01 PROCEDURE — 94640 AIRWAY INHALATION TREATMENT: CPT

## 2021-01-01 PROCEDURE — 36415 COLL VENOUS BLD VENIPUNCTURE: CPT

## 2021-01-01 PROCEDURE — 6360000002 HC RX W HCPCS: Performed by: EMERGENCY MEDICINE

## 2021-01-01 PROCEDURE — 82962 GLUCOSE BLOOD TEST: CPT

## 2021-01-01 PROCEDURE — 97161 PT EVAL LOW COMPLEX 20 MIN: CPT

## 2021-01-01 PROCEDURE — 6370000000 HC RX 637 (ALT 250 FOR IP): Performed by: INTERNAL MEDICINE

## 2021-01-01 PROCEDURE — 97530 THERAPEUTIC ACTIVITIES: CPT

## 2021-01-01 PROCEDURE — 97165 OT EVAL LOW COMPLEX 30 MIN: CPT

## 2021-01-01 PROCEDURE — 74177 CT ABD & PELVIS W/CONTRAST: CPT

## 2021-01-01 PROCEDURE — 2580000003 HC RX 258: Performed by: EMERGENCY MEDICINE

## 2021-01-01 PROCEDURE — 72125 CT NECK SPINE W/O DYE: CPT

## 2021-01-01 PROCEDURE — 85027 COMPLETE CBC AUTOMATED: CPT

## 2021-01-01 PROCEDURE — 6360000004 HC RX CONTRAST MEDICATION: Performed by: RADIOLOGY

## 2021-01-01 PROCEDURE — 2700000000 HC OXYGEN THERAPY PER DAY

## 2021-01-01 PROCEDURE — 99285 EMERGENCY DEPT VISIT HI MDM: CPT

## 2021-01-01 PROCEDURE — 87635 SARS-COV-2 COVID-19 AMP PRB: CPT

## 2021-01-01 PROCEDURE — G0378 HOSPITAL OBSERVATION PER HR: HCPCS

## 2021-01-01 PROCEDURE — 70450 CT HEAD/BRAIN W/O DYE: CPT

## 2021-01-01 PROCEDURE — 93005 ELECTROCARDIOGRAM TRACING: CPT | Performed by: EMERGENCY MEDICINE

## 2021-01-01 PROCEDURE — 6370000000 HC RX 637 (ALT 250 FOR IP): Performed by: EMERGENCY MEDICINE

## 2021-01-01 PROCEDURE — 92610 EVALUATE SWALLOWING FUNCTION: CPT | Performed by: SPEECH-LANGUAGE PATHOLOGIST

## 2021-01-01 PROCEDURE — 2580000003 HC RX 258: Performed by: INTERNAL MEDICINE

## 2021-01-01 PROCEDURE — 80048 BASIC METABOLIC PNL TOTAL CA: CPT

## 2021-01-01 PROCEDURE — 93010 ELECTROCARDIOGRAM REPORT: CPT | Performed by: INTERNAL MEDICINE

## 2021-01-01 PROCEDURE — 85025 COMPLETE CBC W/AUTO DIFF WBC: CPT

## 2021-01-01 PROCEDURE — 71250 CT THORAX DX C-: CPT

## 2021-01-01 PROCEDURE — 83036 HEMOGLOBIN GLYCOSYLATED A1C: CPT

## 2021-01-01 PROCEDURE — 87880 STREP A ASSAY W/OPTIC: CPT

## 2021-01-01 PROCEDURE — 6360000002 HC RX W HCPCS: Performed by: INTERNAL MEDICINE

## 2021-01-01 PROCEDURE — 97535 SELF CARE MNGMENT TRAINING: CPT

## 2021-01-01 PROCEDURE — 1200000000 HC SEMI PRIVATE

## 2021-01-01 PROCEDURE — 6370000000 HC RX 637 (ALT 250 FOR IP): Performed by: PHYSICIAN ASSISTANT

## 2021-01-01 PROCEDURE — 94660 CPAP INITIATION&MGMT: CPT

## 2021-01-01 PROCEDURE — 92526 ORAL FUNCTION THERAPY: CPT | Performed by: SPEECH-LANGUAGE PATHOLOGIST

## 2021-01-01 PROCEDURE — 96374 THER/PROPH/DIAG INJ IV PUSH: CPT

## 2021-01-01 PROCEDURE — 99284 EMERGENCY DEPT VISIT MOD MDM: CPT

## 2021-01-01 PROCEDURE — 80053 COMPREHEN METABOLIC PANEL: CPT

## 2021-01-01 PROCEDURE — 71045 X-RAY EXAM CHEST 1 VIEW: CPT

## 2021-01-01 PROCEDURE — 99232 SBSQ HOSP IP/OBS MODERATE 35: CPT | Performed by: SURGERY

## 2021-01-01 PROCEDURE — 73120 X-RAY EXAM OF HAND: CPT

## 2021-01-01 RX ORDER — POLYETHYLENE GLYCOL 3350 17 G/17G
17 POWDER, FOR SOLUTION ORAL DAILY PRN
Status: DISCONTINUED | OUTPATIENT
Start: 2021-01-01 | End: 2021-01-01 | Stop reason: HOSPADM

## 2021-01-01 RX ORDER — DEXAMETHASONE 6 MG/1
6 TABLET ORAL DAILY
Qty: 3 TABLET | Refills: 0 | Status: SHIPPED | OUTPATIENT
Start: 2021-01-01 | End: 2021-01-01

## 2021-01-01 RX ORDER — ACETAMINOPHEN 650 MG/1
650 SUPPOSITORY RECTAL EVERY 6 HOURS PRN
Status: DISCONTINUED | OUTPATIENT
Start: 2021-01-01 | End: 2021-01-01 | Stop reason: HOSPADM

## 2021-01-01 RX ORDER — DEXTROSE MONOHYDRATE 50 MG/ML
100 INJECTION, SOLUTION INTRAVENOUS PRN
Status: DISCONTINUED | OUTPATIENT
Start: 2021-01-01 | End: 2021-01-01 | Stop reason: HOSPADM

## 2021-01-01 RX ORDER — OXYCODONE HYDROCHLORIDE 5 MG/1
5 TABLET ORAL EVERY 4 HOURS PRN
Status: DISCONTINUED | OUTPATIENT
Start: 2021-01-01 | End: 2021-01-01 | Stop reason: HOSPADM

## 2021-01-01 RX ORDER — INSULIN GLARGINE 100 [IU]/ML
20 INJECTION, SOLUTION SUBCUTANEOUS NIGHTLY
Status: DISCONTINUED | OUTPATIENT
Start: 2021-01-01 | End: 2021-01-01 | Stop reason: HOSPADM

## 2021-01-01 RX ORDER — ACETAMINOPHEN 325 MG/1
650 TABLET ORAL EVERY 6 HOURS PRN
Status: DISCONTINUED | OUTPATIENT
Start: 2021-01-01 | End: 2021-01-01 | Stop reason: HOSPADM

## 2021-01-01 RX ORDER — INSULIN GLARGINE 100 [IU]/ML
15 INJECTION, SOLUTION SUBCUTANEOUS NIGHTLY
Status: DISCONTINUED | OUTPATIENT
Start: 2021-01-01 | End: 2021-01-01

## 2021-01-01 RX ORDER — METHOCARBAMOL 750 MG/1
750 TABLET, FILM COATED ORAL 4 TIMES DAILY
Status: DISCONTINUED | OUTPATIENT
Start: 2021-01-01 | End: 2021-01-01 | Stop reason: HOSPADM

## 2021-01-01 RX ORDER — TAMSULOSIN HYDROCHLORIDE 0.4 MG/1
0.4 CAPSULE ORAL DAILY
Status: DISCONTINUED | OUTPATIENT
Start: 2021-01-01 | End: 2021-01-01 | Stop reason: HOSPADM

## 2021-01-01 RX ORDER — IPRATROPIUM BROMIDE AND ALBUTEROL SULFATE 2.5; .5 MG/3ML; MG/3ML
1 SOLUTION RESPIRATORY (INHALATION)
Status: COMPLETED | OUTPATIENT
Start: 2021-01-01 | End: 2021-01-01

## 2021-01-01 RX ORDER — LIDOCAINE 4 G/G
1 PATCH TOPICAL DAILY
Qty: 10 PATCH | Refills: 0 | Status: SHIPPED | OUTPATIENT
Start: 2021-01-01

## 2021-01-01 RX ORDER — DEXAMETHASONE 4 MG/1
6 TABLET ORAL DAILY
Status: DISCONTINUED | OUTPATIENT
Start: 2021-01-01 | End: 2021-01-01 | Stop reason: HOSPADM

## 2021-01-01 RX ORDER — FENTANYL CITRATE 50 UG/ML
25 INJECTION, SOLUTION INTRAMUSCULAR; INTRAVENOUS ONCE
Status: COMPLETED | OUTPATIENT
Start: 2021-01-01 | End: 2021-01-01

## 2021-01-01 RX ORDER — SODIUM CHLORIDE 9 MG/ML
25 INJECTION, SOLUTION INTRAVENOUS PRN
Status: DISCONTINUED | OUTPATIENT
Start: 2021-01-01 | End: 2021-01-01 | Stop reason: HOSPADM

## 2021-01-01 RX ORDER — SODIUM CHLORIDE 0.9 % (FLUSH) 0.9 %
5-40 SYRINGE (ML) INJECTION PRN
Status: DISCONTINUED | OUTPATIENT
Start: 2021-01-01 | End: 2021-01-01 | Stop reason: HOSPADM

## 2021-01-01 RX ORDER — ONDANSETRON 4 MG/1
4 TABLET, ORALLY DISINTEGRATING ORAL EVERY 8 HOURS PRN
Status: DISCONTINUED | OUTPATIENT
Start: 2021-01-01 | End: 2021-01-01 | Stop reason: HOSPADM

## 2021-01-01 RX ORDER — FINASTERIDE 5 MG/1
5 TABLET, FILM COATED ORAL DAILY
Status: DISCONTINUED | OUTPATIENT
Start: 2021-01-01 | End: 2021-01-01 | Stop reason: HOSPADM

## 2021-01-01 RX ORDER — METHOCARBAMOL 500 MG/1
1500 TABLET, FILM COATED ORAL 4 TIMES DAILY
Status: DISCONTINUED | OUTPATIENT
Start: 2021-01-01 | End: 2021-01-01

## 2021-01-01 RX ORDER — LIDOCAINE 4 G/G
1 PATCH TOPICAL DAILY
Status: DISCONTINUED | OUTPATIENT
Start: 2021-01-01 | End: 2021-01-01 | Stop reason: HOSPADM

## 2021-01-01 RX ORDER — SODIUM CHLORIDE 0.9 % (FLUSH) 0.9 %
10 SYRINGE (ML) INJECTION ONCE
Status: DISCONTINUED | OUTPATIENT
Start: 2021-01-01 | End: 2021-01-01 | Stop reason: HOSPADM

## 2021-01-01 RX ORDER — ASPIRIN 81 MG/1
81 TABLET, CHEWABLE ORAL EVERY MORNING
Status: DISCONTINUED | OUTPATIENT
Start: 2021-01-01 | End: 2021-01-01 | Stop reason: HOSPADM

## 2021-01-01 RX ORDER — DEXTROSE MONOHYDRATE 25 G/50ML
12.5 INJECTION, SOLUTION INTRAVENOUS PRN
Status: DISCONTINUED | OUTPATIENT
Start: 2021-01-01 | End: 2021-01-01 | Stop reason: HOSPADM

## 2021-01-01 RX ORDER — FUROSEMIDE 20 MG/1
20 TABLET ORAL DAILY
Status: DISCONTINUED | OUTPATIENT
Start: 2021-01-01 | End: 2021-01-01 | Stop reason: HOSPADM

## 2021-01-01 RX ORDER — ESCITALOPRAM OXALATE 10 MG/1
10 TABLET ORAL DAILY
Status: DISCONTINUED | OUTPATIENT
Start: 2021-01-01 | End: 2021-01-01 | Stop reason: HOSPADM

## 2021-01-01 RX ORDER — OXYCODONE HYDROCHLORIDE 5 MG/1
2.5 TABLET ORAL EVERY 4 HOURS PRN
Status: DISCONTINUED | OUTPATIENT
Start: 2021-01-01 | End: 2021-01-01 | Stop reason: HOSPADM

## 2021-01-01 RX ORDER — INSULIN DETEMIR 100 [IU]/ML
20 INJECTION, SOLUTION SUBCUTANEOUS EVERY EVENING
Qty: 5 PEN | Refills: 3 | Status: SHIPPED
Start: 2021-01-01

## 2021-01-01 RX ORDER — GABAPENTIN 100 MG/1
100 CAPSULE ORAL 3 TIMES DAILY
Status: DISCONTINUED | OUTPATIENT
Start: 2021-01-01 | End: 2021-01-01 | Stop reason: HOSPADM

## 2021-01-01 RX ORDER — DEXAMETHASONE SODIUM PHOSPHATE 10 MG/ML
10 INJECTION INTRAMUSCULAR; INTRAVENOUS ONCE
Status: DISCONTINUED | OUTPATIENT
Start: 2021-01-01 | End: 2021-01-01

## 2021-01-01 RX ORDER — NICOTINE POLACRILEX 4 MG
15 LOZENGE BUCCAL PRN
Status: DISCONTINUED | OUTPATIENT
Start: 2021-01-01 | End: 2021-01-01 | Stop reason: HOSPADM

## 2021-01-01 RX ORDER — SODIUM CHLORIDE 9 MG/ML
500 INJECTION, SOLUTION INTRAVENOUS CONTINUOUS
Status: DISCONTINUED | OUTPATIENT
Start: 2021-01-01 | End: 2021-01-01 | Stop reason: HOSPADM

## 2021-01-01 RX ORDER — ATORVASTATIN CALCIUM 10 MG/1
10 TABLET, FILM COATED ORAL NIGHTLY
Status: DISCONTINUED | OUTPATIENT
Start: 2021-01-01 | End: 2021-01-01 | Stop reason: HOSPADM

## 2021-01-01 RX ORDER — SODIUM CHLORIDE 0.9 % (FLUSH) 0.9 %
5-40 SYRINGE (ML) INJECTION EVERY 12 HOURS SCHEDULED
Status: DISCONTINUED | OUTPATIENT
Start: 2021-01-01 | End: 2021-01-01 | Stop reason: HOSPADM

## 2021-01-01 RX ORDER — ONDANSETRON 2 MG/ML
4 INJECTION INTRAMUSCULAR; INTRAVENOUS EVERY 6 HOURS PRN
Status: DISCONTINUED | OUTPATIENT
Start: 2021-01-01 | End: 2021-01-01 | Stop reason: HOSPADM

## 2021-01-01 RX ORDER — METOPROLOL SUCCINATE 50 MG/1
50 TABLET, EXTENDED RELEASE ORAL EVERY MORNING
Status: DISCONTINUED | OUTPATIENT
Start: 2021-01-01 | End: 2021-01-01 | Stop reason: HOSPADM

## 2021-01-01 RX ADMIN — METHOCARBAMOL 750 MG: 750 TABLET ORAL at 17:42

## 2021-01-01 RX ADMIN — INSULIN LISPRO 3 UNITS: 100 INJECTION, SOLUTION INTRAVENOUS; SUBCUTANEOUS at 20:00

## 2021-01-01 RX ADMIN — IOPAMIDOL 90 ML: 755 INJECTION, SOLUTION INTRAVENOUS at 13:01

## 2021-01-01 RX ADMIN — DEXAMETHASONE 6 MG: 4 TABLET ORAL at 08:25

## 2021-01-01 RX ADMIN — FINASTERIDE 5 MG: 5 TABLET, FILM COATED ORAL at 09:09

## 2021-01-01 RX ADMIN — TAMSULOSIN HYDROCHLORIDE 0.4 MG: 0.4 CAPSULE ORAL at 20:00

## 2021-01-01 RX ADMIN — GABAPENTIN 100 MG: 100 CAPSULE ORAL at 14:52

## 2021-01-01 RX ADMIN — DEXAMETHASONE 6 MG: 4 TABLET ORAL at 09:10

## 2021-01-01 RX ADMIN — METHOCARBAMOL 750 MG: 750 TABLET ORAL at 08:25

## 2021-01-01 RX ADMIN — ATORVASTATIN CALCIUM 10 MG: 10 TABLET, FILM COATED ORAL at 20:45

## 2021-01-01 RX ADMIN — METOPROLOL SUCCINATE 50 MG: 50 TABLET, EXTENDED RELEASE ORAL at 08:25

## 2021-01-01 RX ADMIN — GABAPENTIN 100 MG: 100 CAPSULE ORAL at 14:50

## 2021-01-01 RX ADMIN — IPRATROPIUM BROMIDE AND ALBUTEROL SULFATE 1 AMPULE: .5; 2.5 SOLUTION RESPIRATORY (INHALATION) at 00:04

## 2021-01-01 RX ADMIN — SODIUM CHLORIDE 500 ML: 9 INJECTION, SOLUTION INTRAVENOUS at 09:45

## 2021-01-01 RX ADMIN — METHOCARBAMOL 750 MG: 750 TABLET ORAL at 19:47

## 2021-01-01 RX ADMIN — APIXABAN 5 MG: 5 TABLET, FILM COATED ORAL at 08:25

## 2021-01-01 RX ADMIN — FUROSEMIDE 20 MG: 20 TABLET ORAL at 09:13

## 2021-01-01 RX ADMIN — METHOCARBAMOL 1500 MG: 500 TABLET ORAL at 20:00

## 2021-01-01 RX ADMIN — ASPIRIN 81 MG CHEWABLE TABLET 81 MG: 81 TABLET CHEWABLE at 09:09

## 2021-01-01 RX ADMIN — ASPIRIN 81 MG CHEWABLE TABLET 81 MG: 81 TABLET CHEWABLE at 08:26

## 2021-01-01 RX ADMIN — Medication 10 ML: at 20:00

## 2021-01-01 RX ADMIN — GABAPENTIN 100 MG: 100 CAPSULE ORAL at 20:00

## 2021-01-01 RX ADMIN — GABAPENTIN 100 MG: 100 CAPSULE ORAL at 08:25

## 2021-01-01 RX ADMIN — METHOCARBAMOL 750 MG: 750 TABLET ORAL at 20:45

## 2021-01-01 RX ADMIN — INSULIN LISPRO 4 UNITS: 100 INJECTION, SOLUTION INTRAVENOUS; SUBCUTANEOUS at 17:59

## 2021-01-01 RX ADMIN — APIXABAN 5 MG: 5 TABLET, FILM COATED ORAL at 20:45

## 2021-01-01 RX ADMIN — FINASTERIDE 5 MG: 5 TABLET, FILM COATED ORAL at 08:26

## 2021-01-01 RX ADMIN — OXYCODONE 5 MG: 5 TABLET ORAL at 14:51

## 2021-01-01 RX ADMIN — GABAPENTIN 100 MG: 100 CAPSULE ORAL at 19:47

## 2021-01-01 RX ADMIN — METHOCARBAMOL 750 MG: 750 TABLET ORAL at 13:11

## 2021-01-01 RX ADMIN — METOPROLOL SUCCINATE 50 MG: 50 TABLET, EXTENDED RELEASE ORAL at 09:09

## 2021-01-01 RX ADMIN — IPRATROPIUM BROMIDE AND ALBUTEROL SULFATE 1 AMPULE: .5; 2.5 SOLUTION RESPIRATORY (INHALATION) at 00:03

## 2021-01-01 RX ADMIN — FENTANYL CITRATE 25 MCG: 0.05 INJECTION, SOLUTION INTRAMUSCULAR; INTRAVENOUS at 10:09

## 2021-01-01 RX ADMIN — GABAPENTIN 100 MG: 100 CAPSULE ORAL at 09:09

## 2021-01-01 RX ADMIN — METHOCARBAMOL 750 MG: 750 TABLET ORAL at 09:09

## 2021-01-01 RX ADMIN — INSULIN GLARGINE 15 UNITS: 100 INJECTION, SOLUTION SUBCUTANEOUS at 21:00

## 2021-01-01 RX ADMIN — INSULIN LISPRO 2 UNITS: 100 INJECTION, SOLUTION INTRAVENOUS; SUBCUTANEOUS at 06:27

## 2021-01-01 RX ADMIN — INSULIN LISPRO 4 UNITS: 100 INJECTION, SOLUTION INTRAVENOUS; SUBCUTANEOUS at 12:20

## 2021-01-01 RX ADMIN — APIXABAN 5 MG: 5 TABLET, FILM COATED ORAL at 20:00

## 2021-01-01 RX ADMIN — GABAPENTIN 100 MG: 100 CAPSULE ORAL at 13:31

## 2021-01-01 RX ADMIN — ESCITALOPRAM 10 MG: 10 TABLET, FILM COATED ORAL at 09:10

## 2021-01-01 RX ADMIN — TAMSULOSIN HYDROCHLORIDE 0.4 MG: 0.4 CAPSULE ORAL at 09:10

## 2021-01-01 RX ADMIN — INSULIN LISPRO 3 UNITS: 100 INJECTION, SOLUTION INTRAVENOUS; SUBCUTANEOUS at 05:52

## 2021-01-01 RX ADMIN — INSULIN LISPRO 3 UNITS: 100 INJECTION, SOLUTION INTRAVENOUS; SUBCUTANEOUS at 17:17

## 2021-01-01 RX ADMIN — GABAPENTIN 100 MG: 100 CAPSULE ORAL at 20:45

## 2021-01-01 RX ADMIN — ATORVASTATIN CALCIUM 10 MG: 10 TABLET, FILM COATED ORAL at 19:47

## 2021-01-01 RX ADMIN — TAMSULOSIN HYDROCHLORIDE 0.4 MG: 0.4 CAPSULE ORAL at 10:15

## 2021-01-01 RX ADMIN — ATORVASTATIN CALCIUM 10 MG: 10 TABLET, FILM COATED ORAL at 20:00

## 2021-01-01 RX ADMIN — METHOCARBAMOL 750 MG: 750 TABLET ORAL at 16:32

## 2021-01-01 RX ADMIN — APIXABAN 5 MG: 5 TABLET, FILM COATED ORAL at 09:09

## 2021-01-01 RX ADMIN — ESCITALOPRAM 10 MG: 10 TABLET, FILM COATED ORAL at 10:14

## 2021-01-01 RX ADMIN — INSULIN LISPRO 8 UNITS: 100 INJECTION, SOLUTION INTRAVENOUS; SUBCUTANEOUS at 21:00

## 2021-01-01 RX ADMIN — APIXABAN 5 MG: 5 TABLET, FILM COATED ORAL at 19:47

## 2021-01-01 RX ADMIN — METHOCARBAMOL 750 MG: 750 TABLET ORAL at 11:52

## 2021-01-01 RX ADMIN — Medication 10 ML: at 09:11

## 2021-01-01 RX ADMIN — DEXAMETHASONE 6 MG: 4 TABLET ORAL at 14:50

## 2021-01-01 RX ADMIN — Medication 10 ML: at 10:15

## 2021-01-01 RX ADMIN — FUROSEMIDE 20 MG: 20 TABLET ORAL at 08:25

## 2021-01-01 RX ADMIN — IPRATROPIUM BROMIDE AND ALBUTEROL SULFATE 1 AMPULE: .5; 2.5 SOLUTION RESPIRATORY (INHALATION) at 00:02

## 2021-01-01 RX ADMIN — INSULIN LISPRO 4 UNITS: 100 INJECTION, SOLUTION INTRAVENOUS; SUBCUTANEOUS at 12:00

## 2021-01-01 ASSESSMENT — PAIN SCALES - GENERAL
PAINLEVEL_OUTOF10: 2
PAINLEVEL_OUTOF10: 4
PAINLEVEL_OUTOF10: 9
PAINLEVEL_OUTOF10: 6
PAINLEVEL_OUTOF10: 6
PAINLEVEL_OUTOF10: 3
PAINLEVEL_OUTOF10: 0
PAINLEVEL_OUTOF10: 3
PAINLEVEL_OUTOF10: 6

## 2021-01-01 ASSESSMENT — PAIN DESCRIPTION - DESCRIPTORS
DESCRIPTORS: ACHING;CONSTANT
DESCRIPTORS: ACHING
DESCRIPTORS: ACHING;CONSTANT
DESCRIPTORS: ACHING;CONSTANT

## 2021-01-01 ASSESSMENT — PAIN DESCRIPTION - LOCATION
LOCATION: RIB CAGE

## 2021-01-01 ASSESSMENT — PAIN DESCRIPTION - ORIENTATION
ORIENTATION: RIGHT

## 2021-01-01 ASSESSMENT — PAIN DESCRIPTION - PAIN TYPE
TYPE: ACUTE PAIN

## 2021-10-03 PROBLEM — S22.39XA CLOSED FRACTURE OF ONE RIB: Status: ACTIVE | Noted: 2021-01-01

## 2021-10-03 PROBLEM — U07.1 COVID-19: Chronic | Status: ACTIVE | Noted: 2021-01-01

## 2021-10-03 NOTE — CONSULTS
TRAUMA HISTORY & PHYSICAL  Surgical Resident/Advance Practice Nurse  10/3/2021  12:08 PM    PRIMARY SURVEY    CHIEF COMPLAINT:  Trauma consult. Patient is a transfer from [facility] after a mechanical fall from standing 10/2/21while using the bathroom. He fell on his right side and was found to have R 4&5 anterior non-displaced rib fractures. He states that he knee gave out. He did not hit his head or lose consciousness. He is on aspirin and Eliquis for history of blood clots. CT head and c[facility]spine at [facility] were unremarkable. CT abdomen pelvis and repeat CT head are pending. AIRWAY:   Airway Normal  EMS ETT Absent  Noisy respirations Absent  Retractions: Absent  Vomiting/bleeding: Absent      BREATHING:    Midaxillary breath sound left:  Normal  Midaxillary breath sound right:  Normal    Cough sound intensity:  good   FiO2: 5 L O2 nasal cannula  SMI 1250 mL.       CIRCULATION:   Femerol pulse intensity: Strong  Palpebral conjunctiva: Pink       Vitals:    10/03/21 1148   BP: 121/60   Pulse: 97   Resp: 16   Temp:    SpO2: 98%       Vitals:    10/03/21 1140 10/03/21 1148   BP: (!) 144/66 121/60   Pulse: 106 97   Resp: 17 16   Temp: 99.3 °F (37.4 °C)    TempSrc: Temporal    SpO2: 95% 98%        FAST EXAM: deferred    Central Nervous System    GCS Initial 15 minutes   Eye  Motor  Verbal 4 - Opens eyes on own  6 - Follows simple motor commands  5 - Alert and oriented 4 - Opens eyes on own  6 - Follows simple motor commands  5 - Alert and oriented     Neuromuscular blockade: No  Pupil size:  Left 3 mm    Right 3 mm  Pupil reaction: Yes    Wiggles fingers: Left Yes Right Yes  Wiggles toes: Left Yes   Right Yes    Hand grasp:   Left  Present      Right  Present  Plantar flexion: Left  Present      Right   Present    Loss of consciousness:  No  History Obtained From:  Patient & EMS  Private Medical Doctor: Bhargavi Mtz MD    Pre-exisiting Medical History:  yes    Conditions: HTN, HLD, diabetes, CAD, stroke, blood clots    Medications:  Current Outpatient Medications   Medication Instructions    apixaban (ELIQUIS) 2.5 MG TABS tablet Oral, 2 TIMES DAILY    apixaban (ELIQUIS) 5 mg, Oral, 2 TIMES DAILY    aspirin 81 mg, Oral, EVERY MORNING    atorvastatin (LIPITOR) 10 mg, Oral, NIGHTLY    dutasteride (AVODART) 0.5 mg, Oral, DAILY    escitalopram (LEXAPRO) 10 mg, Oral, DAILY    furosemide (LASIX) 20 mg, Oral, SEE ADMIN INSTRUCTIONS    glimepiride (AMARYL) 8 mg, Oral, DAILY WITH BREAKFAST    insulin detemir (LEVEMIR FLEXTOUCH) 12 Units, SubCUTAneous, EVERY EVENING    ketoconazole (NIZORAL) 2 % cream Topical, DAILY, Apply topically daily.  linagliptin (TRADJENTA) 5 mg, Oral, DAILY    metoprolol succinate (TOPROL XL) 50 mg, Oral, EVERY MORNING    Multiple Vitamins-Minerals (MULTIVITAMIN ADULT PO) 1 tablet, Oral, EVERY OTHER DAY    oxybutynin (DITROPAN) 5 mg, Oral, EVERY MORNING    tamsulosin (FLOMAX) 0.4 mg, Oral, DAILY    Vitamin D3 5,000 Units, Oral    vitamin E 400 Units, Oral, EVERY MORNING     Allergies: Allergies   Allergen Reactions    Bupivacaine     Niaspan [Niacin Er]     Vicodin [Hydrocodone-Acetaminophen]      hallucinations     Social History:   Tobacco use:  none  Alcohol use:  none  Illicit drug use:  no history of illicit drug use    Past Surgical History:  Knee surgery, tonsillectomy, joint replacement, L1 kyphoplasty    Anticoagulant use:  Yes Eliquis  Antiplatelet use:    Yes aspirin    NSAID use in last 72 hours: yes  Taken PCN in past:  yes  Last food/drink: unknown  Last tetanus: unknown    Family History:   Not pertinent to presenting problem. Complaints:   Head:  None  Neck:   None  Chest:   Moderate  Back:   None  Abdomen:   None  Extremities:   None  Comments:     Review of systems:  All negative unless otherwise noted.         SECONDARY SURVEY  Head/scalp: Atraumatic    Face: Atraumatic    Eyes/ears/nose: Atraumatic    Pharynx/mouth: Atraumatic    Neck: Atraumatic Cervical spine tenderness:   Cervical collar not indicated  Pain:  none  ROM:  normal    Chest wall:  R sided chest/back pain    Heart:  Regular rate & rhythm    Abdomen: Atraumatic. Soft ND  Tenderness:  none    Pelvis: Atraumatic  Tenderness: none    Thoracolumbar spine: Atraumatic  Tenderness:  none    Genitourinary:  Atraumatic. No blood or urine noted    Rectum: Atraumatic. No blood noted. Perineum: Atraumatic. No blood or urine noted. Extremities:   Sensory normal  Motor normal    Distal Pulses  Left arm normal  Right arm normal  Left leg normal  Right leg normal    Capillary refill  Left arm normal  Right arm normal  Left leg normal  Right leg normal    Procedures in ED:  none    In the event of Emergency Blood Transfusion:  Due to the critical condition of this patient, I request the immediate release of blood products for emergency transfusion secondary to shock. I understand the increased risks incurred by the lack of complete transfusion testing.       Radiology: CT Head , CT Cervical spine , CT Chest  and CT Abdomen     Consultations:  none    Admission/Diagnosis: R 4&5 rib fractures, COVID infection    Plan of Treatment:  Admit to medicine  Multi-modal pain control  CT AP and head pending  Encourage incentive spirometer  PT/OT    Plan discussed with Dr. Lacie Miller at 10/3/2021 on 12:08 PM    Electronically signed by Macario Vela MD on 10/3/2021 at 12:08 PM

## 2021-10-03 NOTE — ED NOTES
[facility] after he fell at home with resulting rib fx. Pt is trauma consult. Pt denies hitting head or loc. Pt takes Eliquis. a&o x4.  Pt lives at home with wife     Sear Garsia, [address] [place] [address]  10/03/21 4107

## 2021-10-03 NOTE — LETTER
[place] Medicaid  CERTIFICATION OF NECESSITY  FOR NON-EMERGENCY TRANSPORTATION   BY GROUND AMBULANCE      Individual Information   1. Name: Sofy Duckworth 2. [place] Medicaid Billing Number:    3. Address: [address] [place] [address]  [address] [place] [address]      Transportation Provider Information   4. Provider Name:    5. [place] Provider Number:  National Provider Identifier (NPI):      Certification  7. Criteria:  During transport, this individual requires:  [] Medical treatment or continuous     supervision by an EMT. [] The administration or regulation of oxygen by another person. [] Supervised protective restraint. 8. Period Beginning Date: 10/5/21   9. Length  [x] Not more than 1 day(s)  [] One Year     Additional Information Relevant to Certification   10. Comments or Explanations, If Necessary or Appropriate   Compression L1, Stroke right side weakness, rib fractures     Certifying Practitioner Information   11. Name of Practitioner:   12. [place] Medicaid Provider Number, If Applicable:  [address] Provider Identifier (NPI):      Signature Information   14. Date of Signature: 10/5/21 15. Name of Person Signing: Electronically signed by Suleiman Jackson RN/ on 10/5/2021 at 11:43 AM     16.  Signature and Professional Designation: per  Electronically signed by Suleiman Jackson RN/ discharge planner on 10/5/2021 at 11:43 AM       [place] [address]  Rev. 2015      Patient Information    Patient Name   Mali Kirkpatrick ([id number]) Legal Sex   Male    1927   Room Bed   6418 [id number]-A   Patient Demographics    Address   [facility] [place] [address] Phone   [phone] (Home)   [phone] (Mobile) *Preferred*   Social Security Number    N      Patient Ethnicity & Race    Ethnic Group Patient Race   Non- / Percy Rosario (non-)   Admission Information    Current Information    Attending Provider Admitting Provider Admission Type Admission Status   MD Melinda Donnelly MD Emergency Confirmed Admission          Admission Date/Time Discharge Date Hospital Service Auth/Cert Status   97/65/76  11:33 AM  Med/Surg [facility] Unit Room/Bed    [address] [address]/[id number]            Admission    Complaint      PCP and [facility]   Maria Fernanda Jiménez MD [address]   Payor Information             PRIMARY INSURANCE   Payor: MEDICARE Plan: MEDICARE PART A AND B   Group Number:   Insurance Type: INDEMNITY   Subscriber Name: Aleah Ardon : 1927   Subscriber ID: [id number]       Pat. Rel. to Subscriber: Self       SECONDARY INSURANCE   Payor: [employer] Plan: [address]*   Group Number:   Insurance Type: INDEMNITY   Subscriber Name: Aleah Ardon : 1927   Subscriber ID: [id number]       Pat.  Rel. to Subscriber: SELF          Documents on File     Status Date Received Description   Documents for the Patient   HIPAA Notice of Privacy Signed () 11    Photo ID Received () 11    Insurance Card Received () 16 primary 2016   Physician Office Consent for Treatment Received 16 [facility] 2016   ACP-Advance Directive Not Received     ACP-Power of  Not Received     Financial Responsibility Form      Insurance Card Received () 16 secondary 2016   Progress Notes   11 OV - Dr Yenni Herrera   Progress Notes   5/10/10 OV - Dr Cisco Fleischer   ACP-Advance Directive Received 12    HIPAA Notice of Privacy Signed () 12    Financial Assistance Program Applications Not Received     MyChart Adult Proxy Access Not Received     MyChart Child Proxy Access Not Received     (OLD) [facility]) Physician Consent and NPP Signed () 16   ACO Consent for the Release of  Confidential Alcohol or Drug Treatment Information Not Received     ACO Declining to [address] Not Received     Progress Notes   16 progress note - Dr. Prisca Haines   16 history and physical / [facility]    Correspondence   16 discharge summary    Release of Information   2016 pca auth to receive medical records   Insurance Card  () 16    Condition Of Admission  17    Condition Of Admission  17    [facility] Consent/HIPAA Scanned Not Received     Photo ID Received () 03/15/19 daughter took id home   Insurance Card Received () 03/15/19 daughter took insur card home   Photo ID Received () 20 not obtain   Insurance Card Received 20 not obtain   [address] Physician Consent and NPP Not Received     ACP-Advance Directive Received 20    Photo ID Received () 20    Insurance Card Received 20    Insurance Card Received 20    [facility] Physician Consent and NPP Signed () 20 verbal consent   [facility] Physician Communication Release NPP Signed 20    Photo ID Not Received 10/03/21 UNABLE TO OBTAIN   Photo ID Received 10/03/21 cards unavailable   HIM Release of Information Output  10/04/21 Requested records   Documents for the [facility] Consent Treat/HIPAA Received 10/03/21    [facility] - Medicare Second Copy Given to Pt      Medicare Important Message Received 10/03/21    Medical Problems  Comment     Last edited by  on [facility] Problem List  Date Reviewed: 2020     ICD-10-CM Priority Class Noted POA   Closed fracture of multiple ribs of right side S22.41XA   10/3/2021 Yes   HTN (hypertension), benign I10   2012 Yes   Diabetes mellitus type 2, uncontrolled ([facility] E11.65   2017 Yes   Lower leg DVT (deep venous thromboembolism), acute, right ([facility]) I82.4Z1   3/15/2019 Yes   Chronic heart failure with preserved ejection fraction ([facility] I50.32   9/12/2020 Yes   COVID-19 (Chronic) U07.1   10/3/2021 Yes   Pneumonia due to COVID-19 virus U07.1, J12.82   10/4/2021 Yes   Non-Hospital Problem List  Date Reviewed: 9/21/2020     ICD-10-CM Priority Class Noted   Mixed hyperlipidemia E78.2   5/8/2012   History of CVA (cerebrovascular accident) (Chronic) Z86.73   3/15/2019   Cancer of pancreas ([facility] C25.9   9/12/2020

## 2021-10-03 NOTE — ED NOTES
[address] access center for transfer to [facility] ED per Dr. Carlos Escoto  Fall/multiple rib fractures  Dr. Carlos Escoto spoke to Dr. Shawnee Donovan - accepting physician     [facility]  10/03/21 0954    1000 PAS ETA 1100 per access center       [facility]  10/03/21 1039

## 2021-10-03 NOTE — ED PROVIDER NOTES
HPI:  10/3/21, Time: 7:03 AM EDT         Jamilah Neely is a 80 y.o. male presenting to the ED for Fall RT rib and back pain , beginning several minutes ago. The complaint has been persistent, moderate in severity, and worsened by deep breath. Patient got up to use the restroom and had a mechanical fall however he told EMS that he was dizzy before he fell and struck the ground of his right side. Complaining of pain with deep breathing. Patient is unsure if he struck his head. Also complains of upper back pain. Patient is history of cerebral artery occlusion diabetes history of blood clots and pneumothoraces. Patient that he is on blood thinners but cannot tell me what medication. He has no nausea vomiting or abdominal pain. He denies any neurological deficits or complaints. No fevers chills reported. He denies melena hematochezia diarrhea heme diarrhea or dysuria. ROS:   Pertinent positives and negatives are stated within HPI, all other systems reviewed and are negative.  --------------------------------------------- PAST HISTORY ---------------------------------------------  Past Medical History:  has a past medical history of Compression fracture, Depression, Diabetes ([facility] Diabetes mellitus ([facility] H/O blood clots, Heart murmur, Hyperlipidemia, Hypertension, Pneumothorax, Possible atherosclerotic coronary artery disease., Stroke (cerebrum) ([facility] and Unspecified cerebral artery occlusion with cerebral infarction. Past Surgical History:  has a past surgical history that includes knee surgery; Tonsillectomy; joint replacement; and other surgical history (11/5/2015). Social History:  reports that he quit smoking about 48 years ago. His smoking use included cigarettes. He has never used smokeless tobacco. He reports that he does not drink alcohol and does not use drugs. Family History: family history includes Heart Disease in his mother.      The patients home medications have been MCV 93.1 80.0 - 99.9 fL    MCH 30.3 26.0 - 35.0 pg    MCHC 32.5 32.0 - 34.5 %    RDW 13.5 11.5 - 15.0 fL    Platelets 835 168 - 454 E9/L    MPV 9.8 7.0 - 12.0 fL   Basic Metabolic Panel   Result Value Ref Range    Sodium 137 132 - 146 mmol/L    Potassium 4.5 3.5 - 5.0 mmol/L    Chloride 100 98 - 107 mmol/L    CO2 27 22 - 29 mmol/L    Anion Gap 10 7 - 16 mmol/L    Glucose 90 74 - 99 mg/dL    BUN 26 (H) 6 - 23 mg/dL    CREATININE 1.2 0.7 - 1.2 mg/dL    GFR Non-African American 56 >=60 mL/min/1.73    GFR African American >60     Calcium 9.3 8.6 - 10.2 mg/dL   EKG 12 Lead   Result Value Ref Range    Ventricular Rate 82 BPM    Atrial Rate 82 BPM    P-R Interval 186 ms    QRS Duration 118 ms    Q-T Interval 392 ms    QTc Calculation (Bazett) 457 ms    P Axis 21 degrees    R Axis -28 degrees    T Axis -16 degrees       RADIOLOGY:  Interpreted by Radiologist.  CT CHEST WO CONTRAST   Final Result   Nondisplaced right rib fractures at least involving the anterior 4th and 5th. CT HEAD WO CONTRAST   Final Result   1. There is no acute intracranial abnormality. Specifically, there is no   intracranial hemorrhage. 2. Atrophy and periventricular leukomalacia,   3. There are old lacunar infarcts within the right and left basal ganglia. CT CERVICAL SPINE WO CONTRAST   Final Result   1. There is no acute compression fracture or subluxation of the cervical   spine. 2. Advanced multilevel degenerative disc and degenerative joint disease. 3. Old ununited fracture of the base of the dens.                EKG Interpretation  Interpreted by emergency department physician    Rhythm: normal sinus   Rate: 82  Axis: normal  Conduction: right bundle branch block (incomplete)  ST Segments: nonspecific changes  T Waves: non specific changes    Clinical Impression: right bundle branch block  Comparison to prior EKG: None      ------------------------- NURSING NOTES AND VITALS REVIEWED ---------------------------   The voice recognition software.  Every effort was made to ensure accuracy; however, inadvertent computerized transcription errors may be present        Bharat Daly DO  10/05/21 5955

## 2021-10-03 NOTE — ED PROVIDER NOTES
HPI:  10/3/21,   Time: 11:52 AM EDT         Sofy Duckworth is a 80 y.o. male presenting to the ED for transfer from [facility] due to a fall with multiple rib fractures being Covid positive and on Eliquis, beginning several hours ago. The complaint has been persistent, moderate in severity, and worsened by changing position. Patient states that he standing in the bathroom lost balance fell struck his torso injuring his chest and abdomen. He was seen at [place] [address] where a CAT scan of his brain revealed no acute pathology but he was noted to have multiple rib fractures. Patient was sent over here for further trauma consultation. ROS:   Pertinent positives and negatives are stated within HPI, all other systems reviewed and are negative.  --------------------------------------------- PAST HISTORY ---------------------------------------------  Past Medical History:  has a past medical history of Compression fracture, Depression, Diabetes ([facility] Diabetes mellitus ([facility] H/O blood clots, Heart murmur, Hyperlipidemia, Hypertension, Pneumothorax, Possible atherosclerotic coronary artery disease., Stroke (cerebrum) ([facility] and Unspecified cerebral artery occlusion with cerebral infarction. Past Surgical History:  has a past surgical history that includes knee surgery; Tonsillectomy; joint replacement; and other surgical history (11/5/2015). Social History:  reports that he quit smoking about 48 years ago. His smoking use included cigarettes. He has never used smokeless tobacco. He reports that he does not drink alcohol and does not use drugs. Family History: family history includes Heart Disease in his mother. The patients home medications have been reviewed.     Allergies: Bupivacaine, Niaspan [niacin er], and Vicodin [hydrocodone-acetaminophen]    -------------------------------------------------- RESULTS -------------------------------------------------  All laboratory and radiology results have been personally reviewed by myself   LABS:  No results found for this visit on 10/03/21. RADIOLOGY:  Interpreted by Radiologist.  [address]   Final Result   1. There is no acute intracranial abnormality. Specifically, there is been   no development of an intracranial hemorrhage since the earlier study obtained   4 hours earlier. .   2. Atrophy and periventricular leukomalacia,         CT ABDOMEN PELVIS W IV CONTRAST Additional Contrast? None    (Results Pending)       ------------------------- NURSING NOTES AND VITALS REVIEWED ---------------------------   The nursing notes within the ED encounter and vital signs as below have been reviewed. /60   Pulse 97   Temp 99.3 °F (37.4 °C) (Temporal)   Resp 16   SpO2 98%   Oxygen Saturation Interpretation: Normal      ---------------------------------------------------PHYSICAL EXAM--------------------------------------      Constitutional/General: Alert and oriented x3, well appearing, non toxic in NAD  Head: NC/AT  Eyes: PERRL, EOMI  Mouth: Oropharynx clear, handling secretions, no trismus  Neck: Supple, full ROM, no meningeal signs  Pulmonary: Lungs clear to auscultation bilaterally, no wheezes, rales, or rhonchi. Not in respiratory distress  Cardiovascular:  Regular rate and rhythm, no murmurs, gallops, or rubs. 2+ distal pulses  Abdomen: Soft, tender right upper quadrant, non distended,   Extremities: Moves all extremities x 4.  Warm and well perfused  Skin: warm and dry without rash  Neurologic: GCS 15,  Psych: Normal Affect  Chest tender right chest wall no crepitus noted    ------------------------------ ED COURSE/MEDICAL DECISION MAKING----------------------  Medications   sodium chloride flush 0.9 % injection 10 mL (has no administration in time range)   gabapentin (NEURONTIN) capsule 100 mg (has no administration in time range)   methocarbamol (ROBAXIN) tablet 1,500 mg (has no administration in time range)   oxyCODONE (ROXICODONE) immediate release tablet 2.5 mg (has no administration in time range)     Or   oxyCODONE (ROXICODONE) immediate release tablet 5 mg (has no administration in time range)   lidocaine 4 % external patch 1 patch (has no administration in time range)   dexamethasone (DECADRON) tablet 6 mg (has no administration in time range)   iopamidol (ISOVUE-370) 76 % injection 90 mL (90 mLs IntraVENous Given 10/3/21 1301)         Medical Decision Making:    Testing has been performed at [facility] a repeat CAT scan of his head will be performed here as well as a CAT scan of his abdomen. Trauma consult was placed to trauma service to see patient. Trauma asked that the patient be admitted to medicine due to the fact that he has Covid. They stated they would follow the patient. A call was placed to [facility] medical consultants    Counseling: The emergency provider has spoken with the patient and discussed todays results, in addition to providing specific details for the plan of care and counseling regarding the diagnosis and prognosis. Questions are answered at this time and they are agreeable with the plan.      --------------------------------- IMPRESSION AND DISPOSITION ---------------------------------    IMPRESSION  1. Fall from bed, initial encounter    2. COVID    3. Closed fracture of multiple ribs of right side, initial encounter    4.  Coagulopathy ([facility]        DISPOSITION  Disposition: Other Disposition: As per trauma service he was admitted to medicine  Patient condition is stable                  Stormy Servin MD  10/03/21 4408

## 2021-10-03 NOTE — ED NOTES
Name: Sofy Duckworth  : 1927  MRN: [id number]    Date: 10/3/2021    Benefits of immediately proceeding with Radiology exam outweigh the risks and therefore the following is being waived:      [] Pregnancy test    [] Protocol for Iodine allergy    [] MRI questionnaire    [x] BUN/Creatinine        MD Kaitlynn Myles MD  10/03/21 2305

## 2021-10-04 PROBLEM — J12.82 PNEUMONIA DUE TO COVID-19 VIRUS: Status: ACTIVE | Noted: 2021-01-01

## 2021-10-04 PROBLEM — U07.1 PNEUMONIA DUE TO COVID-19 VIRUS: Status: ACTIVE | Noted: 2021-01-01

## 2021-10-04 PROBLEM — S22.41XA CLOSED FRACTURE OF MULTIPLE RIBS OF RIGHT SIDE: Status: ACTIVE | Noted: 2021-01-01

## 2021-10-04 NOTE — PROGRESS NOTES
Trauma Tertiary Survey    Admit Date: 10/3/2021    Hospital day 1    CC:  Mechanical fall        Past Medical History:   Diagnosis Date    Compression fracture     L1    Depression     Diabetes [facility]     Diabetes mellitus ([facility]     H/O blood clots     Heart murmur     Hyperlipidemia 5/8/2012    Hypertension 5/8/2012    Pneumothorax     ECF staff unable to confirm    Possible atherosclerotic coronary artery disease. 5/8/2012    Dr. Odalis Meléndez Stroke (cerebrum) [facility])     Unspecified cerebral artery occlusion with cerebral infarction     slight right sided weakness       Alcohol pre-screening:  Men: How many times in the past year have you had 5 or more drinks in a day?  none    How much do you drink on a daily basis? None    Scheduled Meds:   sodium chloride flush  10 mL IntraVENous Once    gabapentin  100 mg Oral TID    methocarbamol  1,500 mg Oral 4x Daily    lidocaine  1 patch TransDERmal Daily    aspirin  81 mg Oral QAM    apixaban  5 mg Oral BID    atorvastatin  10 mg Oral Nightly    finasteride  5 mg Oral Daily    escitalopram  10 mg Oral Daily    furosemide  20 mg Oral Daily    metoprolol succinate  50 mg Oral QAM    tamsulosin  0.4 mg Oral Daily    sodium chloride flush  5-40 mL IntraVENous 2 times per day    insulin lispro  0-6 Units SubCUTAneous TID WC    insulin lispro  0-3 Units SubCUTAneous Nightly    dexamethasone  6 mg Oral Daily     Continuous Infusions:   dextrose      sodium chloride       PRN Meds:oxyCODONE **OR** oxyCODONE, glucose, dextrose, glucagon (rDNA), dextrose, sodium chloride flush, sodium chloride, ondansetron **OR** ondansetron, polyethylene glycol, acetaminophen **OR** acetaminophen    Subjective:   No acute events overnight. This morning, some garbled speech and appears confused, but patient was sleeping soundly prior to exam. SMI unreliable. Tenderness to palpation right flank.      Objective:     Patient Vitals for the past 8 hrs:   Pulse SpO2 10/04/21 0400 77 97 %       Past Medical History:   Diagnosis Date    Compression fracture     L1    Depression     Diabetes [facility]     Diabetes mellitus ([facility]     H/O blood clots     Heart murmur     Hyperlipidemia 5/8/2012    Hypertension 5/8/2012    Pneumothorax     ECF staff unable to confirm    Possible atherosclerotic coronary artery disease. 5/8/2012    Dr. Germaine Myles Stroke (cerebrum) [facility])     Unspecified cerebral artery occlusion with cerebral infarction     slight right sided weakness       Radiology:  CT HEAD WO CONTRAST   Final Result   1. There is no acute intracranial abnormality. Specifically, there is been   no development of an intracranial hemorrhage since the earlier study obtained   4 hours earlier. .   2. Atrophy and periventricular leukomalacia,         CT ABDOMEN PELVIS W IV CONTRAST Additional Contrast? None   Final Result   1. No evidence of acute intra-abdominal or intrapelvic process. 2. Multiple stable chronic findings as described above including multiple   small cystic lesions in the pancreas, small probable noncalcified gallstone   and bilateral nonobstructing renal calculi. Chronic right iliopsoas muscle   atrophy. PHYSICAL EXAM:   GCS:    4 - Opens eyes on own   6 - Follows simple motor commands  4 - Seems confused, disoriented      Pupil size:  Left 3 mm Right 3 mm  Pupil reaction: Yes  Wiggles fingers: Left yes Right yes  Hand grasp:   Left yes  Right yes  Wiggles toes: Left yes   Right yes  Plantar flexion: Left yes Right yes    PHYSICAL EXAM   General: No apparent distress, comfortable   HEENT: Trachea midline, no masses, Pupils equal round   Chest: Respiratory effort was normal with no retractions or use of accessory muscles. RA  Cardiovascular: Extremities warm, well perfused  Abdomen:  Soft and non distended.   No tenderness, guarding, rebound, or rigidity  Extremities: Moves all 4 extremities, No pedal edema     Spine:     Spine Tenderness ROM   Cervical 0 /10 Normal   Thoracic 0 /10 Normal   Lumbar 0 /10 Normal     Musculoskeletal    Joint Tenderness Swelling ROM   Right shoulder absent absent normal   Left shoulder absent absent normal   Right elbow absent absent normal   Left elbow absent absent normal   Right wrist absent absent normal   Left wrist absent absent normal   Right hand grasp absent absent normal   Left hand grasp absent absent normal   Right hip absent absent normal   Left hip absent absent normal   Right knee absent absent normal   Left knee absent absent normal   Right ankle absent absent normal   Left ankle absent absent normal   Right foot absent absent normal   Left foot absent absent normal       CONSULTS: None    PROCEDURES: None    INJURIES:    Principal Problem:    Closed fracture of one rib  Active Problems:    HTN (hypertension), benign    Diabetes mellitus type 2, uncontrolled (HCC)    Lower leg DVT (deep venous thromboembolism), acute, right (HCC)    Chronic heart failure with preserved ejection fraction ([facility]    COVID-19  Resolved Problems:    * No resolved hospital problems. *        Assessment/Plan:       · Neuro:   Continue to monitor neuro status  · CV: Monitor hemodynamics   · Pulm: Monitor RR and SpO2, pulmonary hygiene, SMI   · GI:  Diet, monitor bowel function   · Renal: Trend Cr and UOP  · ID: No antibiotics indicated  · Endocrine: Monitor glucose  · MSK: Monitor  · Heme: Monitor hemoglobin.  Eliquis    Bowel regime: Glycolax   Pain control/Sedation: Tylenol, Oxy, Robaxin (reduced dose), Neurontin  DVT prophylaxis: SCD's    GI: diet  Mouth/Eye care: Per patient  [place]: none    Code status:    Full Code  Patient/Family update:  As available    Disposition:  Per primary      Electronically signed by Leticia Rodriguez MD on 10/4/21 at 5:52 AM EDT

## 2021-10-04 NOTE — PROGRESS NOTES
precautions       * Training on energy conservation strategies, correct breathing pattern and techniques to improve independence/tolerance for self-care routine  * Functional transfer/mobility training/DME recommendations for increased independence, safety, and fall prevention  * Patient/Family education to increase follow through with safety techniques and functional independence  * Recommendation of environmental modifications for increased safety with functional transfers/mobility and ADLs  * Therapeutic exercise to improve motor endurance, ROM, and functional strength for ADLs/functional transfers  * Therapeutic activities to facilitate/challenge dynamic balance, stand tolerance for increased safety and independence with ADLs  * Therapeutic activities to facilitate gross/fine motor skills for increased independence with ADLs  * Neuro-muscular re-education: facilitation of righting/equilibrium reactions, midline orientation, scapular stability/mobility, normalization of muscle tone, and facilitation of volitional active controled movement  * Positioning to improve skin integrity, interaction with environment and functional independence    Recommended Adaptive Equipment:  TBD     Home Living: Pt lives with wife in a 1 story home with ramp entrance; caregiver assist daily 1030am-5pm; family assists at night   Bathroom setup: walk-in shower    Equipment owned: na    Prior Level of Function: assist with ADLs , assist with IADLs; completed stand pivots to w/c; w/c for mobility.  Reports he has not ambulated in 3 months   Driving: no   Occupation: na    Pain Level: Pt denies pain this session    Cognition: A&O: 4/4; Follows 1-2 step directions   Memory: fair   Sequencing: fair   Problem solving:  fair   Judgement/safety:  fair     Functional Assessment:  AM-PAC Daily Activity Raw Score: 10/24   Initial Eval Status  Date: 10/4/21 Treatment Status  Date: STGs = LTGs  Time frame: 10-14 days   Feeding Moderate Assist    Provided built up foam utensil handles this date  Set-up   Grooming Moderate Assist   Supervision    UB Dressing Maximal Assist   Minimal Assist    LB Dressing Dependent   Moderate Assist    Bathing Maximal Assist  Moderate Assist    Toileting Dependent   Moderate Assist    Bed Mobility  Supine to sit: Minimal Assist   Sit to supine: Maximal Assist   SBA   Functional Transfers Maximal Assist   Minimal Assist    Functional Mobility Moderate Assist    Short ambulation tasks with w/w; cuing on sequencing, hand placement and body mechanics; + desaturation to 88%  Minimal Assist    Balance Sitting:     Static:  SBA    Dynamic: Hong  Standing: mod A     Activity Tolerance F-  F+   Visual/  Perceptual   Grossly wfl                  Vitals:   Rest: O2 sat 96%  Activity: O2 sat 88-92%  Rest: O2 sat 96%    Hand Dominance : pt reports he is ambidextrous    Strength ROM Additional Info:    RUE   proximal 3-/5  Distal 3+/5 Shoulder limited to 60 degrees  Elbow wfl  good  and wfl FMC/dexterity noted during ADL tasks     LUE  proximal 3-/5  Distal 3+/5 Shoulder limited to 90 degrees  Elbow wfl good  and wfl FMC/dexterity noted during ADL tasks     Hearing: wfl  Sensation:wfl  Tone: wfl  Edema:none noted     Comments: Upon arrival patient supine in bed and agreeable to OT Session. Therapist educated pt on role of OT. At end of session, patient seated in chair with call light and phone within reach, all lines and tubes intact. Overall patient demonstrated decreased independence and safety during completion of ADL/functional transfer/mobility tasks. Pt would benefit from continued skilled OT to increase safety and independence with completion of ADL/IADL tasks for functional independence and quality of life.     Treatment: OT treatment provided this date includes: Facilitation of bed mobility (rolling, supine<>sit), unsupported sitting balance at EOB (impacting ADLs; addressing posture, weight shifting, dynamic reaching), functional transfers (various surfaces: bed, chair), standing tolerance tasks at w/w (addressing posture, balance and activity tolerance ; impacting ADLs and functional activity) and several small ambulation tasks to/from chair with w/w (+ unsteadiness; cuing on posture, w/w management and safety) - skilled cuing on sequencing, hand placement, posture, body mechanics, energy conservation techniques and safety. Therapist facilitated self-care retraining: UB/LB self-care tasks (gown, partial bathing task, socks), toileting hygiene task, grooming tasks and self-feeding task (built up foam handles provided) while educating pt on modified techniques, posture, safety and energy conservation techniques. Skilled monitoring of HR, O2 sats and pts response to treatment (see above; cuing on energy conservation techniques and pursed lip breathing)    Rehab Potential: Fair for established goals     Patient / Family Goal: none stated      Patient and/or family were instructed on functional diagnosis, prognosis/goals and OT plan of care. Demonstrated Fair understanding. Eval Complexity: Low    Time In: 840  Time Out: 918  Total Treatment Time: 23 minutes    Min Units   OT Eval Low 97165  x  1   OT Eval Medium 30951      OT Eval High 35819      OT Re-Eval [id number]       Therapeutic Ex 44725       Therapeutic Activities 09571       ADL/Self Care 37633  13  1   Orthotic Management 71384  10  1   Manual 75211     Neuro Re-Ed 64510       Non-Billable Time          Evaluation Time additionally includes thorough review of current medical information, gathering information on past medical history/social history and prior level of function, interpretation of standardized testing/informal observation of tasks, assessment of data and development of plan of care and goals.           Shiela Hurt OTR/L #[id number]

## 2021-10-04 NOTE — PROGRESS NOTES
[facility]  ATTENDING PHYSICIAN PROGRESS NOTE     I have examined the patient and  reviewed the record. I have reviewed all relevant labs and imaging data. The following summarizes my clinical findings and independent assessment. CC: Fall 2 days ago on October 2    S. Patient complains of right-sided chest wall pain to palpation. He notes upper respiratory symptoms for a week or so prior to coming in. O.  No apparent distress  Right chest wall tender to palpation  Mild tenderness right chest wall  Lungs are coarse  Heart normal sinus  Abdomen is soft nontender nondistended  Right hand is ecchymotic and tender to palpation    ASSESSMENT:  Principal Problem:    Closed fracture of multiple ribs of right side  Active Problems:    HTN (hypertension), benign    Diabetes mellitus type 2, uncontrolled (HCC)    Lower leg DVT (deep venous thromboembolism), acute, right (HCC)    Chronic heart failure with preserved ejection fraction ([facility] [address]    COVID-19  Resolved Problems:    * No resolved hospital problems. *       PLAN:  Multiple right-sided rib fractures-needs aggressive pain control and pulmonary toilet. Will decrease Robaxin to 700 mg 4 times daily secondary to sleepiness    Right hand ecchymosis-we will check x-ray to rule out fracture    General diet    History of lower leg DVT-continue Eliquis    19 infection-monitor oxygen saturation. On 3 L nasal cannula    PT OT    DVT Proph: RIGOS/Andrew Yao MD, FACS  10/4/2021  10:13 AM    NOTE: This report was transcribed using voice recognition software. Every effort was made to ensure accuracy; however, inadvertent computerized transcription errors may be present.

## 2021-10-04 NOTE — PROGRESS NOTES
Physical Therapy  Physical Therapy Initial Assessment     Name: Amanda Miramontes  : 1927  MRN: [id number]      Date of Service: 10/4/2021    Evaluating PT: Gurpreet Sebastian PT, DPT [id number]      Room #:  9306/8148-K  Diagnosis:  Coagulopathy [facility]) [D68.9]  Fall from bed, initial encounter [W06. XXXA]  Closed fracture of one rib, unspecified laterality, sequela [S22.39XS]  Closed fracture of multiple ribs of right side, initial encounter [S22.41XA]  COVID [U07.1]  PMHx/PSHx:  HTn, HLD, DM, pneumothorax, compression fx, CVA, hx of blood clots, DM, depression  Precautions:  Fall risk, Droplet Plus Isolation (COVID-19), continuous pulse ox, O2, alarm    SUBJECTIVE:    Pt lives with wife in a single story condo with ramped entry stair(s). Pt used WC as means of mobility prior to admission. Pt stand pivoted to/from WC with assist x1. Pt has not ambulated with [employer] in approximately 3 months. Pt has caregivers daily and family assist at night. OBJECTIVE:   Initial Evaluation  Date: 10/4/21 Treatment Date: Short Term/ Long Term   Goals   AM-PAC 6 Clicks 96/53     Was pt agreeable to Eval/treatment? Yes     Does pt have pain? No current complaints of pain     Bed Mobility  Rolling: NT  Supine to sit: Min A  Sit to supine: Max A  Scooting: SBA to EOB  Rolling: Supervision  Supine to sit: Supervision  Sit to supine: Supervision  Scooting: Supervision   Transfers Sit to stand: Mod A from EOB, Max A from chair  Stand to sit: Max A  Stand pivot: Mod A with [employer]  Sit to stand: SBA  Stand to sit: SBA  Stand pivot: SBA with [employer]   Ambulation   3 feet x2 with [employer] with Mod A  >10 feet with [employer] with SBA   Stair negotiation: ascended and descended NT  NA   ROM BUE: Refer to OT note  BLE: WFL     Strength BUE: Refer to OT note  BLE: NT     Balance Sitting EOB: SBA  Dynamic Standing: Mod A with [employer]  Sitting EOB: Independent   Dynamic Standing: SBA with WW     Pt is A & O x: 4 grossly to person, place, month/year, and situation.  Pt is delayed. Sensation: Pt reports chronic numbness and tingling of hands. Edema: Unremarkable. Vitals on 3 L O2/min:  Rest: O2 sat 97%  Supine to sit: O2 sat 90-93%  Functional reaching at EOB: O2 sat 90-94%  Stand pivot to chair: O2 sat 90-93%  Sitting in chair: O2 sat 93-97%  Stand pivot back to bed: O2 sat 88-90%  Conclusion of session: O2 sat 97%    Patient education  Pt educated on PT role in acute care setting. Patient response to education:   Pt verbalized understanding Pt demonstrated skill Pt requires further education in this area   Yes NA No     ASSESSMENT:    Conditions Requiring Skilled Therapeutic Intervention:    [x]Decreased strength     []Decreased ROM  [x]Decreased functional mobility  [x]Decreased balance   [x]Decreased endurance   []Decreased posture  []Decreased sensation  []Decreased coordination   []Decreased vision  []Decreased safety awareness   []Increased pain       Comments:    Pt was in bed upon room entry, agreeable to PT evaluation. Pt remained on 3 L O2/min throughout session; all vitals are noted above. Pt moves slowly but completed supine to sit transfer with light assistance for trunk mobility. Pt completed functional reaching at EOB then stand pivoted to chair with [employer]. steps were small and unsteady and pt reported R LE felt weak. Pt was seated in chair with heavy assistance required due to posterior lean and uncontrolled descent. Pt rested in chair then completed second transfer and pivoted/ambulated back to bed. Pt required increased assistance from lower surface of chair. Pt was assisted back to bed. Pt was left in bed with all needs met at conclusion of session. Treatment:  Patient practiced and was instructed in the following treatment:     Therapeutic activities:  o Bed mobility: Pt was cued for technique during bed mobility transfers. o Transfers: Pt was cued for hand placement during sit <> stand transfers.  Pt completed multiple transfers from surfaces of varying heights (EOB x1, chair x1). o Ambulation: Pt ambulated to/from chair with [employer] as he was cued for [employer] technique/sequencing and posture.  o Sitting EOB: Dynamic sitting balance was challenged as pt sat at EOB for 10+ minutes. o Vitals and symptoms were closely monitored throughout session during rest breaks and activity. Pt's/family goals:  1. To return home. Prognosis is Fair for reaching above PT goals. Patient and or family understand(s) diagnosis, prognosis, and plan of care. Yes. PHYSICAL THERAPY PLAN OF CARE:    PT POC is established based on physician order and patient diagnosis     Referring provider/PT Order:    Start   Ordering Provider    10/03/21 1400  PT eval and treat Start: 10/03/21 1400, End: 10/03/21 1400, ONE TIME, Standing Count: 1 Occurrences, R     Last continued at transfer on Sun Oct 3, 2021  6:49 PM    Kathy Magana MD        Diagnosis:  Coagulopathy [facility]) [D68.9]  Fall from bed, initial encounter [W06. XXXA]  Closed fracture of one rib, unspecified laterality, sequela [S22.39XS]  Closed fracture of multiple ribs of right side, initial encounter [S22.41XA]  COVID [U07.1]  Specific instructions for next treatment:  Attempt ambulation.     Current Treatment Recommendations:     [x] Strengthening to improve independence with functional mobility   [] ROM to improve independence with functional mobility   [x] Balance Training to improve static/dynamic balance and to reduce fall risk  [x] Endurance Training to improve activity tolerance during functional mobility   [x] Transfer Training to improve safety and independence with all functional transfers   [x] Gait Training to improve gait mechanics, endurance and assess need for appropriate assistive device  [] Stair Training in preparation for safe discharge home and/or into the community   [x] Positioning to prevent skin breakdown and contractures  [x] Safety and Education Training   [x] Patient/Caregiver Education   [] HEP  [] Other     PT long term treatment goals are located in above grid    Frequency of treatments: 2-5x/week x 3-5 days. Time in  0917  Time out  0952    Total Treatment Time  23 minutes     Evaluation Time includes thorough review of current medical information, gathering information on past medical history/social history and prior level of function, completion of standardized testing/informal observation of tasks, assessment of data and education on plan of care and goals.     CPT codes:  [x] Low Complexity PT evaluation 96442  [] Moderate Complexity PT evaluation 10758  [] High Complexity PT evaluation 89113  [] PT Re-evaluation 52719  [] Gait training 34811 0 minutes  [] Manual therapy 02593 0 minutes  [x] Therapeutic activities 38503 23 minutes  [] Therapeutic exercises 45639 0 minutes  [] Neuromuscular reeducation 23275 0 minutes     Yulisa Melton, PT, DPT  [id number]

## 2021-10-04 NOTE — H&P
[address] [facility]  History and Physical      CHIEF COMPLAINT:    Chief Complaint   Patient presents with    Rib Pain (injury)     transfer from [place] for right sided rib fx         Patient of Piero Almaguer MD presents with:  Closed fracture of one rib    History of Present Illness:   Patient states that he was walking at home yesterday and tripped, falling on his right side, experienced immediate pain in his right chest, does not think he hit his head, does not think he syncopized. The pain is mild to moderate, nonradiating, mostly in the posterior right chest.  Denies any other pain or injuries. He was found to be Covid positive though he denies shortness of breath or cough. No other associated symptoms. REVIEW OF SYSTEMS:  Pertinent negatives are above in HPI. 10 point ROS otherwise negative. Past Medical History:   Diagnosis Date    Compression fracture     L1    Depression     Diabetes ([facility] [address]     Diabetes mellitus ([facility] [address]     H/O blood clots     Heart murmur     Hyperlipidemia 5/8/2012    Hypertension 5/8/2012    Pneumothorax     ECF staff unable to confirm    Possible atherosclerotic coronary artery disease.  5/8/2012    Dr. Nicola Reyes Stroke (cerebrum) [facility])     Unspecified cerebral artery occlusion with cerebral infarction     slight right sided weakness         Past Surgical History:   Procedure Laterality Date    JOINT REPLACEMENT      right hip 4 times    KNEE SURGERY      both knees    OTHER SURGICAL HISTORY  11/5/2015    kyphoplasty-L1    TONSILLECTOMY         Medications Prior to Admission:    Medications Prior to Admission: apixaban (ELIQUIS) 2.5 MG TABS tablet, Take by mouth 2 times daily  furosemide (LASIX) 20 MG tablet, Take 1 tablet by mouth See Admin Instructions (Patient taking differently: Take 20 mg by mouth daily )  dutasteride (AVODART) 0.5 MG capsule, Take 0.5 mg by mouth daily  tamsulosin (FLOMAX) 0.4 MG capsule, Take 0.4 mg by mouth daily  apixaban (ELIQUIS) 5 MG TABS tablet, Take 1 tablet by mouth 2 times daily (Patient not taking: Reported on 9/21/2020)  glimepiride (AMARYL) 4 MG tablet, Take 2 tablets by mouth daily (with breakfast)  insulin detemir (LEVEMIR FLEXTOUCH) 100 UNIT/ML injection pen, Inject 12 Units into the skin every evening (Patient taking differently: Inject 15 Units into the skin every evening )  escitalopram (LEXAPRO) 10 MG tablet, Take 10 mg by mouth daily  Cholecalciferol (VITAMIN D3) 5000 units TABS, Take 5,000 Units by mouth  oxybutynin (DITROPAN) 5 MG tablet, Take 5 mg by mouth every morning   atorvastatin (LIPITOR) 10 MG tablet, Take 10 mg by mouth nightly   ketoconazole (NIZORAL) 2 % cream, Apply topically daily Apply topically daily. Multiple Vitamins-Minerals (MULTIVITAMIN ADULT PO), Take 1 tablet by mouth every other day   vitamin E 400 UNIT capsule, Take 400 Units by mouth every morning   aspirin 81 MG tablet, Take 81 mg by mouth every morning   linagliptin (TRADJENTA) 5 MG tablet, Take 5 mg by mouth daily  metoprolol (TOPROL-XL) 50 MG XL tablet, Take 50 mg by mouth every morning     Note that the patient's home medications were reviewed and the above list is accurate to the best of my knowledge at the time of the exam.    Allergies:    Bupivacaine, Niaspan [niacin er], and Vicodin [hydrocodone-acetaminophen]    Social History:    reports that he quit smoking about 48 years ago. His smoking use included cigarettes. He has never used smokeless tobacco. He reports that he does not drink alcohol and does not use drugs. Family History:   family history includes Heart Disease in his mother.       PHYSICAL EXAM:    Vitals:  BP (!) 104/58   Pulse 77   Temp 98.2 °F (36.8 °C) (Oral)   Resp 16   SpO2 97%       General appearance: NAD, conversant  Eyes: Sclerae anicteric, PERRLA  HEENT: AT/NC, MMM  Neck: FROM, supple, no thyromegaly  Lymph: No cervical / supraclavicular lymphadenopathy  Lungs: Clear to auscultation, WOB normal  Chest wall: Mild R sided rib tenderness  CV: RRR, no MRGs, no lower extremity edema  Abdomen: Soft, non-tender; no masses or HSM, +BS  Extremities: FROM without synovitis. No clubbing or cyanosis of the hands. Skin: no rash, induration, lesions, or ulcers  Psych: Calm and cooperative. Normal judgement and insight. Normal mood and affect. Neuro: Alert and interactive, face symmetric, speech fluent. LABS:  All labs reviewed. Of note:  CBC with Differential:    Lab Results   Component Value Date    WBC 9.8 10/03/2021    RBC 4.36 10/03/2021    HGB 13.2 10/03/2021    HCT 40.6 10/03/2021     10/03/2021    MCV 93.1 10/03/2021    MCH 30.3 10/03/2021    MCHC 32.5 10/03/2021    RDW 13.5 10/03/2021    SEGSPCT 74 05/11/2012    LYMPHOPCT 16.7 08/14/2020    MONOPCT 13.9 08/14/2020    BASOPCT 0.7 08/14/2020    MONOSABS 1.14 08/14/2020    LYMPHSABS 1.37 08/14/2020    EOSABS 0.13 08/14/2020    BASOSABS 0.06 08/14/2020     CMP:    Lab Results   Component Value Date     10/03/2021    K 4.5 10/03/2021    K 4.1 07/31/2020     10/03/2021    CO2 27 10/03/2021    BUN 26 10/03/2021    CREATININE 1.2 10/03/2021    GFRAA >60 10/03/2021    LABGLOM 56 10/03/2021    GLUCOSE 90 10/03/2021    GLUCOSE 117 05/11/2012    PROT 5.7 08/13/2020    LABALBU 3.0 08/13/2020    LABALBU 3.5 05/11/2012    CALCIUM 9.3 10/03/2021    BILITOT 0.6 08/13/2020    ALKPHOS 60 08/13/2020    AST 21 08/13/2020    ALT 13 08/13/2020       Imaging:  I've personally reviewed the patient's CT chest  Nondisplaced right rib fractures at least involving the anterior 4th and 5th. Remainder of trauma CT reports reviewed.     EKG:  I've personally reviewed the patient's EKG:  NSR no acute ischemic changes     ASSESSMENT/PLAN:  Principal Problem:    Closed fracture of one rib  Active Problems:    HTN (hypertension), benign    Diabetes mellitus type 2, uncontrolled (HCC)    Lower leg DVT (deep venous thromboembolism), acute, right ([facility] [address] Chronic heart failure with preserved ejection fraction ([facility] [address]    COVID-19  Resolved Problems:    * No resolved hospital problems. *      Vaccinated COVID patient with mild disease, no hypoxemia.   He reports a mechanical fall, no syncope    Decadron    Glargine+SSI    Continue Eliquis    Continue Lasix    Pain control and IS for rib fx    Code status: Full  Requires inpatient level of care  Christina Lo MD    7:51 AM  10/4/2021

## 2021-10-05 NOTE — PLAN OF CARE
pain  Outcome: Completed  Goal: Control of chronic pain  Description: Control of chronic pain  Outcome: Completed     Problem: Skin Integrity:  Goal: Will show no infection signs and symptoms  Description: Will show no infection signs and symptoms  Outcome: Completed  Goal: Absence of new skin breakdown  Description: Absence of new skin breakdown  Outcome: Completed

## 2021-10-05 NOTE — CARE COORDINATION
10/5: Transition of care: Pt came into the ER/[facility] for a fall with multiple rib fx and was transferred/Covid +10/3. Pt resides with his wife who has parkinson and dementia no steps to enter. They have nursing aids via [facility], 7days a week from  10:30a-5p and 8p -11p, with family support coming in around dinner. They have multiple hand rails, shower chair, 2 wheelchairs, a lift chair and 02 with [facility]. Pt normally uses 02 5liters TID when he is up. Daughter had been adding thick it to her father's coffee, since he has been having issues at home. Pt's d/c plan is to return home. Son can transport him home. SW/KEVIN will continue to follow. Electronically signed by Sherly Flaherty RN on 10/5/2021 at 11:05 AM    The Plan for Transition of Care is related to the following treatment goals: DME/HHC    The Patient and/or patient representative Daughter Vinicio Teague was provided with a choice of provider and agrees   with the discharge plan. [x] Yes [] No    Freedom of choice list was provided with basic dialogue that supports the patient's individualized plan of care/goals, treatment preferences and shares the quality data associated with the providers. [x] Yes [] No      Update:  said pt can be d/c home once speech evalulation is done. Daughter wanted us to arrange transportation home via ambulance. [facility] arranged for  at 6pm. Ira/[facility] DME no additional orders needed. [facility] will reevaluate on October 18 once a covid negative. Daniel accepted for HHC/RN PT/OT.

## 2021-10-05 NOTE — PROGRESS NOTES
SPEECH/LANGUAGE PATHOLOGY  CLINICAL ASSESSMENT OF SWALLOWING FUNCTION   and PLAN OF CARE  PATIENT NAME:  Anabel Christine  (male)     MRN:  [id number]    :  1927  (80 y.o.)  STATUS:  Inpatient: Room 6418/6418-A    TODAY'S DATE:  10/5/2021  10/05/21 0730   SLP swallowing-dysphagia evaluation and treatment Start: 10/05/21 0730, End: 10/05/21 0730, ONE TIME, Standing Count: 1 Occurrences, R    Ryan Cardoso MD  REASON FOR REFERRAL: assess oropharyngeal swallow    EVALUATING THERAPIST: Yomaira Sullivan, RAFAEL                 ASSESSMENT:    DYSPHAGIA DIAGNOSIS:   Clinical indicators of swallow within functional limits for age/premorbid functioning      DIET RECOMMENDATIONS:  Easy to chew consistency solids with  thin liquids as tolerated    Pt reports he uses thickener at home, at the recommendation of his granddaughter, however has not had a formal modified barium swallow study. Strongly recommend MBSS upon DC as an OP     FEEDING RECOMMENDATIONS:     Assistance level:  No assistance needed      Compensatory strategies recommended: No strategies are recommended at this time      Discussed recommendations with nursing?: No secondary to no diet/liquid change recommended     SPEECH THERAPY  PLAN OF CARE   The dysphagia POC is established based on physician order, dysphagia diagnosis and results of clinical assessment     Dysphagia therapy is not recommended     Conditions Requiring Skilled Therapeutic Intervention for dysphagia:    not applicable    Specific dysphagia interventions to include:     Not applicable    Specific instructions for next treatment:  not applicable   Patient Treatment Goals:    Short Term Goals:  Not applicable no therapy warranted     Long Term Goals:   Not applicable no therapy warranted      Patient/family Goal:    not applicable                    ADMITTING DIAGNOSIS: Coagulopathy ([facility] [address] [D68.9]  Fall from bed, initial encounter [W06. XXXA]  Closed fracture of one rib, unspecified laterality, sequela [S22.39XS]  Closed fracture of multiple ribs of right side, initial encounter [S22.41XA]  COVID [U07.1]  Pneumonia due to COVID-19 virus [U07.1, J12.82]    VISIT DIAGNOSIS:   Visit Diagnoses       Codes    Fall from bed, initial encounter    -  Primary W06. Suzy Mazariegos     U07.1    Closed fracture of multiple ribs of right side, initial encounter     S22.41XA    Coagulopathy (HCC)     D68.9           PATIENT REPORT/COMPLAINT: reports he coughs at home, \"regurgitates\" liquids    RN cleared patient for participation in assessment     yes     PRIOR LEVEL OF SWALLOW FUNCTION:    PAST HISTORY OF DYSPHAGIA?: yes    Home diet: Regular consistency solids with  Thickened liquids (not specified)  PROCEDURE:  Consistencies Administered During the Evaluation   Liquids: thin liquid   Solids:  pureed foods and soft solid foods      Method of Intake:   cup, straw, spoon  Self fed      Position:   Seated, upright    CLINICAL ASSESSMENT  Oral Stage: The oral stage of swallowing was within functional limits      Pharyngeal Stage:    No signs of aspiration were noted during this evaluation however, silent aspiration cannot be ruled out at bedside. If silent aspiration is suspected, a Videofluoroscopic Study of Swallowing (MBS) is recommended and requires a physician order. Cognition:   Within functional limits for this exam    Oral Peripheral Examination   Adequate lingual/labial strength     Current Respiratory Status    room air     Parameters of Speech Production  Respiration:  Adequate for speech production  Quality:   Within functional limits  Intensity: Within functional limits    Volitional Swallow: Present    Volitional Cough:    Present    Pain: No pain reported. EDUCATION:   The Speech Language Pathologist (SLP) completed education regarding results of evaluation and that intervention is not warranted at this time.   Learner: Patient  Education:  Reviewed results and recommendations of this evaluation, Reviewed diet and strategies, Reviewed signs, symptoms and risks of aspiration, Reviewed ST goals and Plan of Care, Reviewed recommendations for follow-up and Education Related to Potential Risks and Complications Due to Impairment/Illness/Injury  Evaluation of Education: Demonstrates with assistance    This plan will be re-evaluated and revised in 1 week  if warranted. CPT code:  28014  bedside swallow eval / 79091 dysphagia therapy      SLP provided instruction and feedback instructed on use of appropriate swallow position, positions/ strategies to avoid (chin tuck) and benefits of completing an MBSS to fully assess oropharyngeal swallow and make appropriate diet recommendations. Pt was pleased with information, no further questions. [x]The admitting diagnosis and active problem list, have been reviewed prior to initiation of this evaluation.         ACTIVE PROBLEM LIST:   Patient Active Problem List   Diagnosis    HTN (hypertension), benign    Mixed hyperlipidemia    Diabetes mellitus type 2, uncontrolled ([address]    Lower leg DVT (deep venous thromboembolism), acute, right ([address]    History of CVA (cerebrovascular accident)    Chronic heart failure with preserved ejection fraction ([address]    Cancer of pancreas ([address]    Closed fracture of multiple ribs of right side    COVID-19    Pneumonia due to COVID-19 virus           Tan Rosario M.S., CCC-SLP  Speech-Language Pathologist  [id number]  10/5/2021

## 2021-10-05 NOTE — PROGRESS NOTES
CLINICAL PHARMACY NOTE: MEDS TO BEDS    Total # of Prescriptions Filled: 1   The following medications were delivered to the patient:  · Dexamethasone 6mg    Additional Documentation:

## 2021-10-05 NOTE — HOME CARE
[address] [facility] receive referral. Will follow after discharge. Spoke with patient daughter Sandra Acevedo) and verified demographics. Darius Fleming LPN, [address] [facility].

## 2021-10-05 NOTE — ACP (ADVANCE CARE PLANNING)
Advance Care Planning   The patient has the following advanced directives on file:  Advance Directives     Power of  Living Will ACP-Advance Directive ACP-Power of     Not on File Filed on 08/03/20 Filed Not on File          The patient has appointed the following active healthcare agents:    Primary Decision Maker: Penny Andree - Child - [phone]    The Patient has the following current code status:    Code Status: Full Code    Visit Documentation:      Lucian Armijo RN  10/5/2021

## 2021-10-05 NOTE — DISCHARGE INSTR - COC
Continuity of Care Form    Patient Name: Yamil Moore   :  1927  MRN:  [id number]    Admit date:  10/3/2021  Discharge date:  ***    Code Status Order: Full Code   Advance Directives:     Admitting Physician:  Adry Tobias MD  PCP: Heidi Cehrry MD    Discharging Nurse: [facility] Unit/Room#: 9345/8255-X  Discharging Unit Phone Number: ***    Emergency Contact:   Extended Emergency Contact Information  Primary Emergency Contact: Shayy Roach  Address: [address] [place] [address] [address] [facility] Phone: [phone]  Relation: Spouse  Secondary Emergency Contact: Mary Roach   [address] Phone: [phone]  Relation: Child    Past Surgical History:  Past Surgical History:   Procedure Laterality Date    JOINT REPLACEMENT      right hip 4 times    KNEE SURGERY      both knees    OTHER SURGICAL HISTORY  2015    kyphoplasty-L1    TONSILLECTOMY         Immunization History:   Immunization History   Administered Date(s) Administered    COVID-19, EDDIE Duarte, 100mcg/0.5mL 2021, 2021    Influenza Virus Vaccine 10/06/2016    Pneumococcal Conjugate Vaccine 2013    Td, unspecified formulation 2012       Active Problems:  Patient Active Problem List   Diagnosis Code    HTN (hypertension), benign I10    Mixed hyperlipidemia E78.2    Diabetes mellitus type 2, uncontrolled ([facility] [address] E11.65    Lower leg DVT (deep venous thromboembolism), acute, right ([address] I82.4Z1    History of CVA (cerebrovascular accident) Z86.73    Chronic heart failure with preserved ejection fraction (HCC) I50.32    Cancer of pancreas ([facility] [address] C25.9    Closed fracture of multiple ribs of right side S22.41XA    COVID-19 U07.1    Pneumonia due to COVID-19 virus U07.1, J12.82       Isolation/Infection:   Isolation          Droplet Plus        Patient Infection Status     Infection Onset Added Last Indicated Last Indicated By Review Planned Expiration Resolved Resolved By    COVID-19 10/03/21 10/03/21 10/03/21 COVID-19, Rapid 10/10/21 10/17/21            Nurse Assessment:  Last Vital Signs: /74   Pulse 96   Temp 97 °F (36.1 °C) (Oral)   Resp 18   SpO2 97%     Last documented pain score (0-10 scale): Pain Level: 2  Last Weight:   Wt Readings from Last 1 Encounters:   10/03/21 170 lb (77.1 kg)     Mental Status:  {IP PT MENTAL STATUS:}    IV Access:  { TRISTEN IV ACCESS:217938009}    Nursing Mobility/ADLs:  Walking   {CHP DME NWND:632705250}  Transfer  {CHP DME GBLF:069700192}  Bathing  {CHP DME LKK}  Dressing  {CHP DME TJDU:946026688}  Toileting  {CHP DME EYVX:593063256}  Feeding  {CHP DME SQBY:049205298}  Med Admin  {CHP DME ISZQ:916115628}  Med Delivery   { TRISTEN MED Delivery:587813107}    Wound Care Documentation and Therapy:        Elimination:  Continence:   · Bowel: {YES / FO:28767}  · Bladder: {YES / NY:83870}  Urinary Catheter: {Urinary Catheter:817903808}   Colostomy/Ileostomy/Ileal Conduit: {YES / RW:12419}       Date of Last BM: ***  No intake or output data in the 24 hours ending 10/05/21 1427  I/O last 3 completed shifts:  In: -   Out: 200 [Urine:200]    Safety Concerns:     [address] [employer] Safety Concerns:466220028}    Impairments/Disabilities:      [employer] Impairments/Disabilities:624137085}    Nutrition Therapy:  Current Nutrition Therapy:   [employer] Diet List:894868075}    Routes of Feeding: {CHP DME Other Feedings:076967986}  Liquids: {Slp liquid thickness:96740}  Daily Fluid Restriction: {CHP DME Yes amt example:206494152}  Last Modified Barium Swallow with Video (Video Swallowing Test): {Done Not Done EAVB:711997910}    Treatments at the Time of Hospital Discharge:   Respiratory Treatments: ***  Oxygen Therapy:  {Therapy; copd oxygen:94979}  Ventilator:    {LITA CHOWDHURY Vent XDRB:267501363}    Rehab Therapies: {THERAPEUTIC INTERVENTION:4415389668}  Weight Bearing Status/Restrictions: {LITA CHOWDHURY Weight

## 2021-10-05 NOTE — DISCHARGE INSTR - DIET
 Good nutrition is important when healing from an illness, injury, or surgery. Follow any nutrition recommendations given to you during your hospital stay.  If you were given an oral nutrition supplement while in the hospital, continue to take this supplement at home. You can take it with meals, in-between meals, and/or before bedtime. These supplements can be purchased at most local grocery stores, pharmacies, and chain super-stores.  If you have any questions about your diet or nutrition, call the hospital and ask for the dietitian.  Swallowed good with speech - feel free to use thickener as you have at home as needed.

## 2021-10-05 NOTE — PROGRESS NOTES
Received a call from [facility] with physicians ambulance; states  time has been delayed for 2 hrs.

## 2021-10-05 NOTE — PROGRESS NOTES
Patient unable to take pills with water. Aspirated on less than 15ml of water. Patient able to eat dinner. Given medication with apple sauce without issue.

## 2021-10-06 NOTE — ED PROVIDER NOTES
HPI:  10/5/21, Time: 11:42 PM EDT         Christin Richmond is a 80 y.o. male presenting to the ED for respiratory distress beginning today. Patient is a poor historian and breathing rapidly on exam, so I was unable to obtain a complete history. History was obtained from the record as well as EMS report. I reviewed the patient's chart. He was recently admitted for rib fractures, and he was discharged home today. He was incidentally found to have COVID-19 on admission. He is fully vaccinated. He was saturating well on his baseline 3 L of oxygen this morning, so he was discharged home. Per EMS, this evening the patient developed increased work of breathing and almost passed out upon standing. He was brought to the ED for further evaluation. Patient has no complaints on my examination though he is breathing rapidly. Review of Systems:   Unable to obtain complete ROS due to acuity of situation.          --------------------------------------------- PAST HISTORY ---------------------------------------------  Past Medical History:  has a past medical history of Compression fracture, Depression, Diabetes ([address] Diabetes mellitus ([facility] [address] H/O blood clots, Heart murmur, Hyperlipidemia, Hypertension, Pneumothorax, Possible atherosclerotic coronary artery disease., Stroke (cerebrum) ([facility] [address] and Unspecified cerebral artery occlusion with cerebral infarction. Past Surgical History:  has a past surgical history that includes knee surgery; Tonsillectomy; joint replacement; and other surgical history (11/5/2015). Social History:  reports that he quit smoking about 48 years ago. His smoking use included cigarettes. He has never used smokeless tobacco. He reports that he does not drink alcohol and does not use drugs. Family History: family history includes Heart Disease in his mother. The patients home medications have been reviewed.     Allergies: Bupivacaine, Niaspan [niacin er], and Vicodin [hydrocodone-acetaminophen]    -------------------------------------------------- RESULTS -------------------------------------------------  All laboratory and radiology results have been personally reviewed by myself   LABS:  No results found for this visit on 10/05/21. RADIOLOGY:  Interpreted by Radiologist.  XR CHEST PORTABLE   Final Result   Suspect mild patchy infiltrate bilaterally. ------------------------- NURSING NOTES AND VITALS REVIEWED ---------------------------   The nursing notes within the ED encounter and vital signs as below have been reviewed. /67   Pulse 103   Temp 97.6 °F (36.4 °C) (Oral)   Resp 27   Ht 5' 8\" (1.727 m)   Wt 168 lb (76.2 kg)   SpO2 93%   BMI 25.54 kg/m²   Oxygen Saturation Interpretation: Abnormal and Improved after treatment      ---------------------------------------------------PHYSICAL EXAM--------------------------------------      Constitutional/General: Alert and oriented x2, appears ill. Head: Normocephalic and atraumatic  Eyes: PERRL, EOMI  Mouth: Oropharynx clear, handling secretions, no trismus  Neck: Supple, full ROM,   Pulmonary: Lungs diminished throughout, increased work of breathing, tachypneic. Respiratory distress  Cardiovascular:  Regular rhythm, tachycardic, no murmurs, gallops, or rubs. 2+ distal pulses  Abdomen: Soft, non tender, non distended,   Extremities: Moves all extremities x 4. Warm and well perfused  Skin: warm and dry without rash  Neurologic: Awake, no focal motor or sensory deficits   Psych: Normal Affect. Behavior normal.      ------------------------------ ED COURSE/MEDICAL DECISION MAKING----------------------  Medications   ipratropium-albuterol (DUONEB) nebulizer solution 1 ampule (1 ampule Inhalation Given 10/6/21 [facility])       Medical Decision Making/ED COURSE:   Patient is a 42-year-old male with recent diagnosis of COVID-19 presenting with respiratory distress.  In the ED, patient was tachycardic but otherwise hemodynamically stable. He was hypoxic on his baseline oxygen requirement. On exam, he had retractions and increased work of breathing, so he was placed on BiPAP with good improvement of breathing. Patient was placed on the cardiac monitor. I interpreted findings. Patient administered breathing treatments. CXR obtained showing bilateral infiltrates. After extensive conversations with the patient's daughter/POA at bedside, patient's family would like to make the patient [facility] and call hospice. They would ultimately like to bring the patient home today. At this time, all lab work and imaging has been canceled. Patient will be kept comfortable. Hospice has been consulted. Patient remained hemodynamically stable throughout ED course. ED Course as of Oct 06 0134   Tue Oct 05, 2021   2354 Will place on bipap    [JA]   2352 I spoke with the patient's daughter and Joleen Merritt, at bedside. She states that the patient has had increasing oxygen requirements at home up to 6 L. At this time, she is unsure of what she would like the patient's CODE STATUS to be, and she would like to speak with her siblings. She is okay with BiPAP at this time. [JA]   Wed Oct 06, 2021   0013 Patient's breathing markedly improved on reevaluation on BiPAP. [JA]   [id number] I spoke with the patient's daughter and POA once again. She would like patient to be DNR-CC. She would like hospice to be consulted. She wants to bring the patient home. [JA]   [facility] is at bedside. Patient's family is now requesting that he be discharged home right now. They are refusing hospice house. They understand that they will not have any medications for the patient if they were to bring him home in the middle of the night.     [JA]      ED Course User Index  [JA] Aaron Lainez MD       Critical Care:  Please note that the withdrawal or failure to initiate urgent interventions for this patient would likely result in a life threatening deterioration or permanent disability. Accordingly this patient received 36 minutes of critical care time, excluding separately billable procedures. Counseling: The emergency provider has spoken with the patient and daughter and discussed todays results, in addition to providing specific details for the plan of care and counseling regarding the diagnosis and prognosis. Questions are answered at this time and they are agreeable with the plan.      --------------------------------- IMPRESSION AND DISPOSITION ---------------------------------    IMPRESSION  1. Pneumonia due to COVID-19 virus    2. Acute on chronic respiratory failure with hypoxia (HCC)        DISPOSITION  Disposition: Discharge to home  Patient condition is serious      NOTE: This report was transcribed using voice recognition software.  Every effort was made to ensure accuracy; however, inadvertent computerized transcription errors may be present    I, Oralia Riggins MD, am the primary provider of this record       Oralia Riggins MD  10/06/21 0111       Oralia Riggins MD  10/06/21 1141

## 2021-10-06 NOTE — PROGRESS NOTES
Attempted to call daughter [facility] to update that [de-identified] ambulance is here to  patient to take patient home. Unable to contact daughter.